# Patient Record
Sex: MALE | Race: WHITE | NOT HISPANIC OR LATINO | Employment: UNEMPLOYED | URBAN - METROPOLITAN AREA
[De-identification: names, ages, dates, MRNs, and addresses within clinical notes are randomized per-mention and may not be internally consistent; named-entity substitution may affect disease eponyms.]

---

## 2020-03-03 ENCOUNTER — ANESTHESIA EVENT (OUTPATIENT)
Dept: SURGERY | Facility: MEDICAL CENTER | Age: 25
DRG: 580 | End: 2020-03-03
Payer: MEDICARE

## 2020-03-03 ENCOUNTER — ANESTHESIA (OUTPATIENT)
Dept: SURGERY | Facility: MEDICAL CENTER | Age: 25
DRG: 580 | End: 2020-03-03
Payer: MEDICARE

## 2020-03-03 ENCOUNTER — APPOINTMENT (OUTPATIENT)
Dept: RADIOLOGY | Facility: MEDICAL CENTER | Age: 25
DRG: 580 | End: 2020-03-03
Attending: EMERGENCY MEDICINE
Payer: MEDICARE

## 2020-03-03 ENCOUNTER — HOSPITAL ENCOUNTER (INPATIENT)
Facility: MEDICAL CENTER | Age: 25
LOS: 7 days | DRG: 580 | End: 2020-03-10
Attending: EMERGENCY MEDICINE
Payer: MEDICARE

## 2020-03-03 PROBLEM — F11.10 HEROIN ABUSE (HCC): Status: ACTIVE | Noted: 2020-03-03

## 2020-03-03 PROBLEM — F10.939 ALCOHOL WITHDRAWAL (HCC): Status: ACTIVE | Noted: 2020-03-03

## 2020-03-03 PROBLEM — L02.31 ABSCESS OF RIGHT BUTTOCK: Status: ACTIVE | Noted: 2020-03-03

## 2020-03-03 PROBLEM — D64.9 NORMOCYTIC ANEMIA: Status: ACTIVE | Noted: 2020-03-03

## 2020-03-03 LAB
ALBUMIN SERPL BCP-MCNC: 3.7 G/DL (ref 3.2–4.9)
ALBUMIN/GLOB SERPL: 1 G/DL
ALP SERPL-CCNC: 84 U/L (ref 30–99)
ALT SERPL-CCNC: 27 U/L (ref 2–50)
ANION GAP SERPL CALC-SCNC: 15 MMOL/L (ref 7–16)
AST SERPL-CCNC: 36 U/L (ref 12–45)
BASOPHILS # BLD AUTO: 1 % (ref 0–1.8)
BASOPHILS # BLD: 0.12 K/UL (ref 0–0.12)
BILIRUB SERPL-MCNC: 0.3 MG/DL (ref 0.1–1.5)
BUN SERPL-MCNC: 12 MG/DL (ref 8–22)
CALCIUM SERPL-MCNC: 8.9 MG/DL (ref 8.4–10.2)
CHLORIDE SERPL-SCNC: 98 MMOL/L (ref 96–112)
CO2 SERPL-SCNC: 26 MMOL/L (ref 20–33)
CREAT SERPL-MCNC: 0.81 MG/DL (ref 0.5–1.4)
EOSINOPHIL # BLD AUTO: 0 K/UL (ref 0–0.51)
EOSINOPHIL NFR BLD: 0 % (ref 0–6.9)
ERYTHROCYTE [DISTWIDTH] IN BLOOD BY AUTOMATED COUNT: 43.6 FL (ref 35.9–50)
GLOBULIN SER CALC-MCNC: 3.7 G/DL (ref 1.9–3.5)
GLUCOSE SERPL-MCNC: 94 MG/DL (ref 65–99)
HCT VFR BLD AUTO: 36.9 % (ref 42–52)
HGB BLD-MCNC: 12.2 G/DL (ref 14–18)
LYMPHOCYTES # BLD AUTO: 0.92 K/UL (ref 1–4.8)
LYMPHOCYTES NFR BLD: 8 % (ref 22–41)
MANUAL DIFF BLD: NORMAL
MCH RBC QN AUTO: 31.1 PG (ref 27–33)
MCHC RBC AUTO-ENTMCNC: 33.1 G/DL (ref 33.7–35.3)
MCV RBC AUTO: 94.1 FL (ref 81.4–97.8)
METAMYELOCYTES NFR BLD MANUAL: 1 %
MONOCYTES # BLD AUTO: 2.19 K/UL (ref 0–0.85)
MONOCYTES NFR BLD AUTO: 19 % (ref 0–13.4)
NEUTROPHILS # BLD AUTO: 8.17 K/UL (ref 1.82–7.42)
NEUTROPHILS NFR BLD: 71 % (ref 44–72)
NRBC # BLD AUTO: 0 K/UL
NRBC BLD-RTO: 0 /100 WBC
PLATELET # BLD AUTO: 452 K/UL (ref 164–446)
PLATELET BLD QL SMEAR: NORMAL
PMV BLD AUTO: 9.2 FL (ref 9–12.9)
POTASSIUM SERPL-SCNC: 4.4 MMOL/L (ref 3.6–5.5)
PROT SERPL-MCNC: 7.4 G/DL (ref 6–8.2)
RBC # BLD AUTO: 3.92 M/UL (ref 4.7–6.1)
RBC BLD AUTO: NORMAL
SODIUM SERPL-SCNC: 139 MMOL/L (ref 135–145)
TOXIC GRANULES BLD QL SMEAR: SLIGHT
WBC # BLD AUTO: 11.5 K/UL (ref 4.8–10.8)

## 2020-03-03 PROCEDURE — 72193 CT PELVIS W/DYE: CPT

## 2020-03-03 PROCEDURE — 770006 HCHG ROOM/CARE - MED/SURG/GYN SEMI*

## 2020-03-03 PROCEDURE — 160035 HCHG PACU - 1ST 60 MINS PHASE I: Performed by: SURGERY

## 2020-03-03 PROCEDURE — 99291 CRITICAL CARE FIRST HOUR: CPT

## 2020-03-03 PROCEDURE — 700105 HCHG RX REV CODE 258: Performed by: EMERGENCY MEDICINE

## 2020-03-03 PROCEDURE — 700111 HCHG RX REV CODE 636 W/ 250 OVERRIDE (IP): Performed by: ANESTHESIOLOGY

## 2020-03-03 PROCEDURE — 160036 HCHG PACU - EA ADDL 30 MINS PHASE I: Performed by: SURGERY

## 2020-03-03 PROCEDURE — 700101 HCHG RX REV CODE 250: Performed by: ANESTHESIOLOGY

## 2020-03-03 PROCEDURE — 99223 1ST HOSP IP/OBS HIGH 75: CPT | Performed by: INTERNAL MEDICINE

## 2020-03-03 PROCEDURE — 87077 CULTURE AEROBIC IDENTIFY: CPT

## 2020-03-03 PROCEDURE — A6407 PACKING STRIPS, NON-IMPREG: HCPCS | Performed by: SURGERY

## 2020-03-03 PROCEDURE — 80053 COMPREHEN METABOLIC PANEL: CPT

## 2020-03-03 PROCEDURE — 96365 THER/PROPH/DIAG IV INF INIT: CPT

## 2020-03-03 PROCEDURE — 700117 HCHG RX CONTRAST REV CODE 255: Performed by: EMERGENCY MEDICINE

## 2020-03-03 PROCEDURE — 700111 HCHG RX REV CODE 636 W/ 250 OVERRIDE (IP): Performed by: EMERGENCY MEDICINE

## 2020-03-03 PROCEDURE — 85027 COMPLETE CBC AUTOMATED: CPT

## 2020-03-03 PROCEDURE — 87040 BLOOD CULTURE FOR BACTERIA: CPT

## 2020-03-03 PROCEDURE — 87075 CULTR BACTERIA EXCEPT BLOOD: CPT

## 2020-03-03 PROCEDURE — 160028 HCHG SURGERY MINUTES - 1ST 30 MINS LEVEL 3: Performed by: SURGERY

## 2020-03-03 PROCEDURE — 87205 SMEAR GRAM STAIN: CPT

## 2020-03-03 PROCEDURE — 700111 HCHG RX REV CODE 636 W/ 250 OVERRIDE (IP)

## 2020-03-03 PROCEDURE — 700102 HCHG RX REV CODE 250 W/ 637 OVERRIDE(OP): Performed by: INTERNAL MEDICINE

## 2020-03-03 PROCEDURE — 700105 HCHG RX REV CODE 258: Performed by: INTERNAL MEDICINE

## 2020-03-03 PROCEDURE — 85007 BL SMEAR W/DIFF WBC COUNT: CPT

## 2020-03-03 PROCEDURE — 160009 HCHG ANES TIME/MIN: Performed by: SURGERY

## 2020-03-03 PROCEDURE — 96367 TX/PROPH/DG ADDL SEQ IV INF: CPT

## 2020-03-03 PROCEDURE — 87070 CULTURE OTHR SPECIMN AEROBIC: CPT

## 2020-03-03 PROCEDURE — 0J990ZZ DRAINAGE OF BUTTOCK SUBCUTANEOUS TISSUE AND FASCIA, OPEN APPROACH: ICD-10-PCS | Performed by: SURGERY

## 2020-03-03 PROCEDURE — A9270 NON-COVERED ITEM OR SERVICE: HCPCS | Performed by: INTERNAL MEDICINE

## 2020-03-03 PROCEDURE — 160002 HCHG RECOVERY MINUTES (STAT): Performed by: SURGERY

## 2020-03-03 PROCEDURE — 160048 HCHG OR STATISTICAL LEVEL 1-5: Performed by: SURGERY

## 2020-03-03 PROCEDURE — 700102 HCHG RX REV CODE 250 W/ 637 OVERRIDE(OP)

## 2020-03-03 PROCEDURE — 700111 HCHG RX REV CODE 636 W/ 250 OVERRIDE (IP): Performed by: INTERNAL MEDICINE

## 2020-03-03 PROCEDURE — 700105 HCHG RX REV CODE 258: Performed by: ANESTHESIOLOGY

## 2020-03-03 PROCEDURE — 87186 SC STD MICRODIL/AGAR DIL: CPT

## 2020-03-03 PROCEDURE — 36415 COLL VENOUS BLD VENIPUNCTURE: CPT

## 2020-03-03 PROCEDURE — A9270 NON-COVERED ITEM OR SERVICE: HCPCS

## 2020-03-03 RX ORDER — THIAMINE MONONITRATE (VIT B1) 100 MG
100 TABLET ORAL DAILY
Status: COMPLETED | OUTPATIENT
Start: 2020-03-04 | End: 2020-03-07

## 2020-03-03 RX ORDER — ONDANSETRON 4 MG/1
4 TABLET, ORALLY DISINTEGRATING ORAL EVERY 4 HOURS PRN
Status: DISCONTINUED | OUTPATIENT
Start: 2020-03-03 | End: 2020-03-10 | Stop reason: HOSPADM

## 2020-03-03 RX ORDER — HYDRALAZINE HYDROCHLORIDE 20 MG/ML
5 INJECTION INTRAMUSCULAR; INTRAVENOUS
Status: DISCONTINUED | OUTPATIENT
Start: 2020-03-03 | End: 2020-03-03 | Stop reason: HOSPADM

## 2020-03-03 RX ORDER — ONDANSETRON 2 MG/ML
4 INJECTION INTRAMUSCULAR; INTRAVENOUS
Status: DISCONTINUED | OUTPATIENT
Start: 2020-03-03 | End: 2020-03-03 | Stop reason: HOSPADM

## 2020-03-03 RX ORDER — POLYETHYLENE GLYCOL 3350 17 G/17G
1 POWDER, FOR SOLUTION ORAL
Status: DISCONTINUED | OUTPATIENT
Start: 2020-03-03 | End: 2020-03-10 | Stop reason: HOSPADM

## 2020-03-03 RX ORDER — ACETAMINOPHEN 325 MG/1
650 TABLET ORAL EVERY 6 HOURS PRN
Status: DISCONTINUED | OUTPATIENT
Start: 2020-03-03 | End: 2020-03-10 | Stop reason: HOSPADM

## 2020-03-03 RX ORDER — LORAZEPAM 2 MG/ML
1 INJECTION INTRAMUSCULAR
Status: DISCONTINUED | OUTPATIENT
Start: 2020-03-03 | End: 2020-03-09

## 2020-03-03 RX ORDER — LIDOCAINE HYDROCHLORIDE 20 MG/ML
INJECTION, SOLUTION EPIDURAL; INFILTRATION; INTRACAUDAL; PERINEURAL PRN
Status: DISCONTINUED | OUTPATIENT
Start: 2020-03-03 | End: 2020-03-03 | Stop reason: SURG

## 2020-03-03 RX ORDER — LORAZEPAM 1 MG/1
4 TABLET ORAL
Status: DISCONTINUED | OUTPATIENT
Start: 2020-03-03 | End: 2020-03-09

## 2020-03-03 RX ORDER — LORAZEPAM 0.5 MG/1
0.5 TABLET ORAL EVERY 4 HOURS PRN
Status: DISCONTINUED | OUTPATIENT
Start: 2020-03-03 | End: 2020-03-10 | Stop reason: HOSPADM

## 2020-03-03 RX ORDER — OXYCODONE HCL 5 MG/5 ML
SOLUTION, ORAL ORAL
Status: COMPLETED
Start: 2020-03-03 | End: 2020-03-03

## 2020-03-03 RX ORDER — LORAZEPAM 1 MG/1
1 TABLET ORAL EVERY 4 HOURS PRN
Status: DISCONTINUED | OUTPATIENT
Start: 2020-03-03 | End: 2020-03-09

## 2020-03-03 RX ORDER — LORAZEPAM 2 MG/ML
0.5 INJECTION INTRAMUSCULAR EVERY 4 HOURS PRN
Status: DISCONTINUED | OUTPATIENT
Start: 2020-03-03 | End: 2020-03-09

## 2020-03-03 RX ORDER — OXYCODONE HCL 5 MG/5 ML
10 SOLUTION, ORAL ORAL
Status: COMPLETED | OUTPATIENT
Start: 2020-03-03 | End: 2020-03-03

## 2020-03-03 RX ORDER — HYDROMORPHONE HYDROCHLORIDE 1 MG/ML
0.5 INJECTION, SOLUTION INTRAMUSCULAR; INTRAVENOUS; SUBCUTANEOUS
Status: DISCONTINUED | OUTPATIENT
Start: 2020-03-03 | End: 2020-03-03 | Stop reason: HOSPADM

## 2020-03-03 RX ORDER — LORAZEPAM 1 MG/1
3 TABLET ORAL
Status: DISCONTINUED | OUTPATIENT
Start: 2020-03-03 | End: 2020-03-09

## 2020-03-03 RX ORDER — ROCURONIUM BROMIDE 10 MG/ML
INJECTION, SOLUTION INTRAVENOUS PRN
Status: DISCONTINUED | OUTPATIENT
Start: 2020-03-03 | End: 2020-03-03 | Stop reason: SURG

## 2020-03-03 RX ORDER — LORAZEPAM 1 MG/1
2 TABLET ORAL
Status: DISCONTINUED | OUTPATIENT
Start: 2020-03-03 | End: 2020-03-09

## 2020-03-03 RX ORDER — SODIUM CHLORIDE, SODIUM LACTATE, POTASSIUM CHLORIDE, CALCIUM CHLORIDE 600; 310; 30; 20 MG/100ML; MG/100ML; MG/100ML; MG/100ML
INJECTION, SOLUTION INTRAVENOUS
Status: DISCONTINUED | OUTPATIENT
Start: 2020-03-03 | End: 2020-03-03 | Stop reason: SURG

## 2020-03-03 RX ORDER — SODIUM CHLORIDE, SODIUM LACTATE, POTASSIUM CHLORIDE, CALCIUM CHLORIDE 600; 310; 30; 20 MG/100ML; MG/100ML; MG/100ML; MG/100ML
INJECTION, SOLUTION INTRAVENOUS CONTINUOUS
Status: DISCONTINUED | OUTPATIENT
Start: 2020-03-03 | End: 2020-03-03 | Stop reason: HOSPADM

## 2020-03-03 RX ORDER — MEPERIDINE HYDROCHLORIDE 25 MG/ML
12.5 INJECTION INTRAMUSCULAR; INTRAVENOUS; SUBCUTANEOUS
Status: DISCONTINUED | OUTPATIENT
Start: 2020-03-03 | End: 2020-03-03 | Stop reason: HOSPADM

## 2020-03-03 RX ORDER — AMOXICILLIN 250 MG
2 CAPSULE ORAL 2 TIMES DAILY
Status: DISCONTINUED | OUTPATIENT
Start: 2020-03-03 | End: 2020-03-10 | Stop reason: HOSPADM

## 2020-03-03 RX ORDER — CLONIDINE HYDROCHLORIDE 0.1 MG/1
0.1 TABLET ORAL
Status: DISCONTINUED | OUTPATIENT
Start: 2020-03-03 | End: 2020-03-10 | Stop reason: HOSPADM

## 2020-03-03 RX ORDER — OXYCODONE HCL 5 MG/5 ML
5 SOLUTION, ORAL ORAL
Status: COMPLETED | OUTPATIENT
Start: 2020-03-03 | End: 2020-03-03

## 2020-03-03 RX ORDER — MIDAZOLAM HYDROCHLORIDE 1 MG/ML
INJECTION INTRAMUSCULAR; INTRAVENOUS PRN
Status: DISCONTINUED | OUTPATIENT
Start: 2020-03-03 | End: 2020-03-03 | Stop reason: SURG

## 2020-03-03 RX ORDER — HALOPERIDOL 5 MG/ML
1 INJECTION INTRAMUSCULAR
Status: DISCONTINUED | OUTPATIENT
Start: 2020-03-03 | End: 2020-03-03 | Stop reason: HOSPADM

## 2020-03-03 RX ORDER — ONDANSETRON 2 MG/ML
4 INJECTION INTRAMUSCULAR; INTRAVENOUS EVERY 4 HOURS PRN
Status: DISCONTINUED | OUTPATIENT
Start: 2020-03-03 | End: 2020-03-10 | Stop reason: HOSPADM

## 2020-03-03 RX ORDER — PROCHLORPERAZINE EDISYLATE 5 MG/ML
5-10 INJECTION INTRAMUSCULAR; INTRAVENOUS EVERY 4 HOURS PRN
Status: DISCONTINUED | OUTPATIENT
Start: 2020-03-03 | End: 2020-03-10 | Stop reason: HOSPADM

## 2020-03-03 RX ORDER — PROMETHAZINE HYDROCHLORIDE 25 MG/1
12.5-25 SUPPOSITORY RECTAL EVERY 4 HOURS PRN
Status: DISCONTINUED | OUTPATIENT
Start: 2020-03-03 | End: 2020-03-10 | Stop reason: HOSPADM

## 2020-03-03 RX ORDER — CEFOTETAN DISODIUM 2 G/20ML
INJECTION, POWDER, FOR SOLUTION INTRAMUSCULAR; INTRAVENOUS PRN
Status: DISCONTINUED | OUTPATIENT
Start: 2020-03-03 | End: 2020-03-03 | Stop reason: SURG

## 2020-03-03 RX ORDER — ENALAPRILAT 1.25 MG/ML
1.25 INJECTION INTRAVENOUS EVERY 6 HOURS PRN
Status: DISCONTINUED | OUTPATIENT
Start: 2020-03-03 | End: 2020-03-10 | Stop reason: HOSPADM

## 2020-03-03 RX ORDER — BISACODYL 10 MG
10 SUPPOSITORY, RECTAL RECTAL
Status: DISCONTINUED | OUTPATIENT
Start: 2020-03-03 | End: 2020-03-10 | Stop reason: HOSPADM

## 2020-03-03 RX ORDER — LORAZEPAM 2 MG/ML
2 INJECTION INTRAMUSCULAR
Status: DISCONTINUED | OUTPATIENT
Start: 2020-03-03 | End: 2020-03-09

## 2020-03-03 RX ORDER — SODIUM CHLORIDE 9 MG/ML
INJECTION, SOLUTION INTRAVENOUS CONTINUOUS
Status: DISCONTINUED | OUTPATIENT
Start: 2020-03-03 | End: 2020-03-05

## 2020-03-03 RX ORDER — DIPHENHYDRAMINE HYDROCHLORIDE 50 MG/ML
12.5 INJECTION INTRAMUSCULAR; INTRAVENOUS
Status: DISCONTINUED | OUTPATIENT
Start: 2020-03-03 | End: 2020-03-03 | Stop reason: HOSPADM

## 2020-03-03 RX ORDER — FOLIC ACID 1 MG/1
1 TABLET ORAL DAILY
Status: COMPLETED | OUTPATIENT
Start: 2020-03-04 | End: 2020-03-07

## 2020-03-03 RX ORDER — ONDANSETRON 2 MG/ML
INJECTION INTRAMUSCULAR; INTRAVENOUS PRN
Status: DISCONTINUED | OUTPATIENT
Start: 2020-03-03 | End: 2020-03-03 | Stop reason: SURG

## 2020-03-03 RX ORDER — HYDROMORPHONE HYDROCHLORIDE 1 MG/ML
0.2 INJECTION, SOLUTION INTRAMUSCULAR; INTRAVENOUS; SUBCUTANEOUS
Status: DISCONTINUED | OUTPATIENT
Start: 2020-03-03 | End: 2020-03-03 | Stop reason: HOSPADM

## 2020-03-03 RX ORDER — DEXAMETHASONE SODIUM PHOSPHATE 4 MG/ML
INJECTION, SOLUTION INTRA-ARTICULAR; INTRALESIONAL; INTRAMUSCULAR; INTRAVENOUS; SOFT TISSUE PRN
Status: DISCONTINUED | OUTPATIENT
Start: 2020-03-03 | End: 2020-03-03 | Stop reason: SURG

## 2020-03-03 RX ORDER — MAGNESIUM SULFATE HEPTAHYDRATE 40 MG/ML
2 INJECTION, SOLUTION INTRAVENOUS ONCE
Status: ACTIVE | OUTPATIENT
Start: 2020-03-03 | End: 2020-03-04

## 2020-03-03 RX ORDER — LORAZEPAM 2 MG/ML
1.5 INJECTION INTRAMUSCULAR
Status: DISCONTINUED | OUTPATIENT
Start: 2020-03-03 | End: 2020-03-09

## 2020-03-03 RX ORDER — HYDROMORPHONE HYDROCHLORIDE 1 MG/ML
0.4 INJECTION, SOLUTION INTRAMUSCULAR; INTRAVENOUS; SUBCUTANEOUS
Status: DISCONTINUED | OUTPATIENT
Start: 2020-03-03 | End: 2020-03-03 | Stop reason: HOSPADM

## 2020-03-03 RX ORDER — PROMETHAZINE HYDROCHLORIDE 25 MG/1
12.5-25 TABLET ORAL EVERY 4 HOURS PRN
Status: DISCONTINUED | OUTPATIENT
Start: 2020-03-03 | End: 2020-03-10 | Stop reason: HOSPADM

## 2020-03-03 RX ADMIN — FENTANYL CITRATE 50 MCG: 50 INJECTION, SOLUTION INTRAMUSCULAR; INTRAVENOUS at 20:52

## 2020-03-03 RX ADMIN — Medication 10 MG: at 21:36

## 2020-03-03 RX ADMIN — FENTANYL CITRATE 50 MCG: 50 INJECTION INTRAMUSCULAR; INTRAVENOUS at 21:35

## 2020-03-03 RX ADMIN — SUGAMMADEX 200 MG: 100 INJECTION, SOLUTION INTRAVENOUS at 21:04

## 2020-03-03 RX ADMIN — LIDOCAINE HYDROCHLORIDE 50 MG: 20 INJECTION, SOLUTION EPIDURAL; INFILTRATION; INTRACAUDAL; PERINEURAL at 20:41

## 2020-03-03 RX ADMIN — PROPOFOL 150 MG: 10 INJECTION, EMULSION INTRAVENOUS at 20:41

## 2020-03-03 RX ADMIN — CEFOTETAN DISODIUM 2 G: 2 INJECTION, POWDER, FOR SOLUTION INTRAMUSCULAR; INTRAVENOUS at 20:38

## 2020-03-03 RX ADMIN — SODIUM CHLORIDE: 9 INJECTION, SOLUTION INTRAVENOUS at 23:26

## 2020-03-03 RX ADMIN — FENTANYL CITRATE 50 MCG: 50 INJECTION INTRAMUSCULAR; INTRAVENOUS at 21:43

## 2020-03-03 RX ADMIN — OXYCODONE HYDROCHLORIDE 10 MG: 5 SOLUTION ORAL at 21:36

## 2020-03-03 RX ADMIN — VANCOMYCIN HYDROCHLORIDE 1250 MG: 500 INJECTION, POWDER, LYOPHILIZED, FOR SOLUTION INTRAVENOUS at 19:16

## 2020-03-03 RX ADMIN — MIDAZOLAM HYDROCHLORIDE 2 MG: 1 INJECTION, SOLUTION INTRAMUSCULAR; INTRAVENOUS at 20:38

## 2020-03-03 RX ADMIN — ROCURONIUM BROMIDE 50 MG: 10 INJECTION, SOLUTION INTRAVENOUS at 20:41

## 2020-03-03 RX ADMIN — AMPICILLIN AND SULBACTAM 3 G: 2; 1 INJECTION, POWDER, FOR SOLUTION INTRAMUSCULAR; INTRAVENOUS at 18:29

## 2020-03-03 RX ADMIN — LORAZEPAM 0.5 MG: 0.5 TABLET ORAL at 23:38

## 2020-03-03 RX ADMIN — DEXAMETHASONE SODIUM PHOSPHATE 8 MG: 4 INJECTION, SOLUTION INTRAMUSCULAR; INTRAVENOUS at 20:47

## 2020-03-03 RX ADMIN — AMPICILLIN SODIUM AND SULBACTAM SODIUM 3 G: 2; 1 INJECTION, POWDER, FOR SOLUTION INTRAMUSCULAR; INTRAVENOUS at 23:20

## 2020-03-03 RX ADMIN — FENTANYL CITRATE 100 MCG: 50 INJECTION, SOLUTION INTRAMUSCULAR; INTRAVENOUS at 20:41

## 2020-03-03 RX ADMIN — IOHEXOL 100 ML: 350 INJECTION, SOLUTION INTRAVENOUS at 19:06

## 2020-03-03 RX ADMIN — SODIUM CHLORIDE, POTASSIUM CHLORIDE, SODIUM LACTATE AND CALCIUM CHLORIDE: 600; 310; 30; 20 INJECTION, SOLUTION INTRAVENOUS at 20:38

## 2020-03-03 RX ADMIN — ONDANSETRON 4 MG: 2 INJECTION INTRAMUSCULAR; INTRAVENOUS at 21:04

## 2020-03-03 ASSESSMENT — ENCOUNTER SYMPTOMS
MYALGIAS: 0
CLAUDICATION: 0
TREMORS: 0
SPUTUM PRODUCTION: 0
SHORTNESS OF BREATH: 0
NERVOUS/ANXIOUS: 0
NAUSEA: 0
ABDOMINAL PAIN: 0
DIAPHORESIS: 0
TINGLING: 0
INSOMNIA: 0
DEPRESSION: 0
HEARTBURN: 0
PALPITATIONS: 0
DIARRHEA: 0
HEADACHES: 0
CONSTIPATION: 0
FEVER: 0
DIZZINESS: 0
BACK PAIN: 0
CHILLS: 0
COUGH: 0

## 2020-03-03 ASSESSMENT — LIFESTYLE VARIABLES
ON A TYPICAL DAY WHEN YOU DRINK ALCOHOL HOW MANY DRINKS DO YOU HAVE: 4
TOTAL SCORE: VERY MILD ITCHING, PINS AND NEEDLES SENSATION, BURNING OR NUMBNESS
TOTAL SCORE: 5
ALCOHOL_USE: YES
NAUSEA AND VOMITING: NO NAUSEA AND NO VOMITING
ORIENTATION AND CLOUDING OF SENSORIUM: ORIENTED AND CAN DO SERIAL ADDITIONS
HEADACHE, FULLNESS IN HEAD: NOT PRESENT
HOW MANY TIMES IN THE PAST YEAR HAVE YOU HAD 5 OR MORE DRINKS IN A DAY: 7
EVER HAD A DRINK FIRST THING IN THE MORNING TO STEADY YOUR NERVES TO GET RID OF A HANGOVER: YES
AGITATION: NORMAL ACTIVITY
TREMOR: NO TREMOR
HAVE PEOPLE ANNOYED YOU BY CRITICIZING YOUR DRINKING: YES
TOTAL SCORE: 4
CONSUMPTION TOTAL: POSITIVE
DO YOU DRINK ALCOHOL: NO
ANXIETY: *
AUDITORY DISTURBANCES: NOT PRESENT
PAROXYSMAL SWEATS: BARELY PERCEPTIBLE SWEATING. PALMS MOIST
HAVE YOU EVER FELT YOU SHOULD CUT DOWN ON YOUR DRINKING: YES
AVERAGE NUMBER OF DAYS PER WEEK YOU HAVE A DRINK CONTAINING ALCOHOL: 6
EVER_SMOKED: YES
EVER FELT BAD OR GUILTY ABOUT YOUR DRINKING: YES
TOTAL SCORE: 4
VISUAL DISTURBANCES: VERY MILD SENSITIVITY
TOTAL SCORE: 4

## 2020-03-03 ASSESSMENT — PATIENT HEALTH QUESTIONNAIRE - PHQ9
2. FEELING DOWN, DEPRESSED, IRRITABLE, OR HOPELESS: NOT AT ALL
1. LITTLE INTEREST OR PLEASURE IN DOING THINGS: NOT AT ALL
SUM OF ALL RESPONSES TO PHQ9 QUESTIONS 1 AND 2: 0

## 2020-03-03 ASSESSMENT — FIBROSIS 4 INDEX: FIB4 SCORE: 0.4

## 2020-03-03 ASSESSMENT — PAIN SCALES - GENERAL: PAIN_LEVEL: 5

## 2020-03-04 ENCOUNTER — APPOINTMENT (OUTPATIENT)
Dept: CARDIOLOGY | Facility: MEDICAL CENTER | Age: 25
DRG: 580 | End: 2020-03-04
Attending: HOSPITALIST
Payer: MEDICARE

## 2020-03-04 LAB
ANION GAP SERPL CALC-SCNC: 11 MMOL/L (ref 7–16)
BASOPHILS # BLD AUTO: 0.7 % (ref 0–1.8)
BASOPHILS # BLD: 0.06 K/UL (ref 0–0.12)
BUN SERPL-MCNC: 11 MG/DL (ref 8–22)
CALCIUM SERPL-MCNC: 8.9 MG/DL (ref 8.4–10.2)
CHLORIDE SERPL-SCNC: 104 MMOL/L (ref 96–112)
CO2 SERPL-SCNC: 24 MMOL/L (ref 20–33)
CREAT SERPL-MCNC: 0.75 MG/DL (ref 0.5–1.4)
EOSINOPHIL # BLD AUTO: 0 K/UL (ref 0–0.51)
EOSINOPHIL NFR BLD: 0 % (ref 0–6.9)
ERYTHROCYTE [DISTWIDTH] IN BLOOD BY AUTOMATED COUNT: 43.6 FL (ref 35.9–50)
GLUCOSE SERPL-MCNC: 146 MG/DL (ref 65–99)
GRAM STN SPEC: NORMAL
HCT VFR BLD AUTO: 37.8 % (ref 42–52)
HGB BLD-MCNC: 12.2 G/DL (ref 14–18)
IMM GRANULOCYTES # BLD AUTO: 0.16 K/UL (ref 0–0.11)
IMM GRANULOCYTES NFR BLD AUTO: 1.9 % (ref 0–0.9)
LV EJECT FRACT  99904: 65
LV EJECT FRACT MOD 2C 99903: 62.14
LV EJECT FRACT MOD 4C 99902: 67.64
LV EJECT FRACT MOD BP 99901: 65.13
LYMPHOCYTES # BLD AUTO: 0.58 K/UL (ref 1–4.8)
LYMPHOCYTES NFR BLD: 6.7 % (ref 22–41)
MAGNESIUM SERPL-MCNC: 2.3 MG/DL (ref 1.5–2.5)
MCH RBC QN AUTO: 30.5 PG (ref 27–33)
MCHC RBC AUTO-ENTMCNC: 32.3 G/DL (ref 33.7–35.3)
MCV RBC AUTO: 94.5 FL (ref 81.4–97.8)
MONOCYTES # BLD AUTO: 0.2 K/UL (ref 0–0.85)
MONOCYTES NFR BLD AUTO: 2.3 % (ref 0–13.4)
NEUTROPHILS # BLD AUTO: 7.62 K/UL (ref 1.82–7.42)
NEUTROPHILS NFR BLD: 88.4 % (ref 44–72)
NRBC # BLD AUTO: 0 K/UL
NRBC BLD-RTO: 0 /100 WBC
PHOSPHATE SERPL-MCNC: 3.3 MG/DL (ref 2.5–4.5)
PLATELET # BLD AUTO: 441 K/UL (ref 164–446)
PMV BLD AUTO: 9.5 FL (ref 9–12.9)
POTASSIUM SERPL-SCNC: 4.4 MMOL/L (ref 3.6–5.5)
RBC # BLD AUTO: 4 M/UL (ref 4.7–6.1)
SIGNIFICANT IND 70042: NORMAL
SITE SITE: NORMAL
SODIUM SERPL-SCNC: 139 MMOL/L (ref 135–145)
SOURCE SOURCE: NORMAL
VANCOMYCIN TROUGH SERPL-MCNC: 10.9 UG/ML (ref 10–20)
WBC # BLD AUTO: 8.6 K/UL (ref 4.8–10.8)

## 2020-03-04 PROCEDURE — 700111 HCHG RX REV CODE 636 W/ 250 OVERRIDE (IP)

## 2020-03-04 PROCEDURE — 80048 BASIC METABOLIC PNL TOTAL CA: CPT

## 2020-03-04 PROCEDURE — 700102 HCHG RX REV CODE 250 W/ 637 OVERRIDE(OP): Performed by: INTERNAL MEDICINE

## 2020-03-04 PROCEDURE — 85025 COMPLETE CBC W/AUTO DIFF WBC: CPT

## 2020-03-04 PROCEDURE — 87040 BLOOD CULTURE FOR BACTERIA: CPT | Mod: 91

## 2020-03-04 PROCEDURE — 87389 HIV-1 AG W/HIV-1&-2 AB AG IA: CPT

## 2020-03-04 PROCEDURE — A6212 FOAM DRG <=16 SQ IN W/BORDER: HCPCS | Performed by: HOSPITALIST

## 2020-03-04 PROCEDURE — 80202 ASSAY OF VANCOMYCIN: CPT

## 2020-03-04 PROCEDURE — 83735 ASSAY OF MAGNESIUM: CPT

## 2020-03-04 PROCEDURE — 36415 COLL VENOUS BLD VENIPUNCTURE: CPT

## 2020-03-04 PROCEDURE — 770020 HCHG ROOM/CARE - TELE (206)

## 2020-03-04 PROCEDURE — 84100 ASSAY OF PHOSPHORUS: CPT

## 2020-03-04 PROCEDURE — 700111 HCHG RX REV CODE 636 W/ 250 OVERRIDE (IP): Performed by: INTERNAL MEDICINE

## 2020-03-04 PROCEDURE — A9270 NON-COVERED ITEM OR SERVICE: HCPCS | Performed by: INTERNAL MEDICINE

## 2020-03-04 PROCEDURE — 700105 HCHG RX REV CODE 258: Performed by: INTERNAL MEDICINE

## 2020-03-04 PROCEDURE — 700111 HCHG RX REV CODE 636 W/ 250 OVERRIDE (IP): Performed by: HOSPITALIST

## 2020-03-04 PROCEDURE — 99233 SBSQ HOSP IP/OBS HIGH 50: CPT | Performed by: HOSPITALIST

## 2020-03-04 PROCEDURE — 93306 TTE W/DOPPLER COMPLETE: CPT | Mod: 26 | Performed by: INTERNAL MEDICINE

## 2020-03-04 PROCEDURE — 93306 TTE W/DOPPLER COMPLETE: CPT

## 2020-03-04 RX ORDER — ASPIRIN 325 MG
650 TABLET ORAL EVERY 6 HOURS PRN
Status: ON HOLD | COMMUNITY
End: 2020-03-10

## 2020-03-04 RX ORDER — NAPROXEN SODIUM 220 MG
440 TABLET ORAL PRN
Status: ON HOLD | COMMUNITY
End: 2020-03-10

## 2020-03-04 RX ORDER — HYDROMORPHONE HYDROCHLORIDE 1 MG/ML
0.5 INJECTION, SOLUTION INTRAMUSCULAR; INTRAVENOUS; SUBCUTANEOUS
Status: COMPLETED | OUTPATIENT
Start: 2020-03-04 | End: 2020-03-04

## 2020-03-04 RX ORDER — HYDROMORPHONE HYDROCHLORIDE 1 MG/ML
0.5 INJECTION, SOLUTION INTRAMUSCULAR; INTRAVENOUS; SUBCUTANEOUS 2 TIMES DAILY PRN
Status: DISCONTINUED | OUTPATIENT
Start: 2020-03-04 | End: 2020-03-10 | Stop reason: HOSPADM

## 2020-03-04 RX ADMIN — AMPICILLIN SODIUM AND SULBACTAM SODIUM 3 G: 2; 1 INJECTION, POWDER, FOR SOLUTION INTRAMUSCULAR; INTRAVENOUS at 12:08

## 2020-03-04 RX ADMIN — LORAZEPAM 1 MG: 1 TABLET ORAL at 07:55

## 2020-03-04 RX ADMIN — SODIUM CHLORIDE: 9 INJECTION, SOLUTION INTRAVENOUS at 12:16

## 2020-03-04 RX ADMIN — AMPICILLIN SODIUM AND SULBACTAM SODIUM 3 G: 2; 1 INJECTION, POWDER, FOR SOLUTION INTRAMUSCULAR; INTRAVENOUS at 05:50

## 2020-03-04 RX ADMIN — HYDROMORPHONE HYDROCHLORIDE 0.5 MG: 1 INJECTION, SOLUTION INTRAMUSCULAR; INTRAVENOUS; SUBCUTANEOUS at 10:58

## 2020-03-04 RX ADMIN — FOLIC ACID 1 MG: 1 TABLET ORAL at 05:50

## 2020-03-04 RX ADMIN — Medication 400 MG: at 17:15

## 2020-03-04 RX ADMIN — LORAZEPAM 2 MG: 1 TABLET ORAL at 17:15

## 2020-03-04 RX ADMIN — Medication 100 MG: at 05:50

## 2020-03-04 RX ADMIN — AMPICILLIN SODIUM AND SULBACTAM SODIUM 3 G: 2; 1 INJECTION, POWDER, FOR SOLUTION INTRAMUSCULAR; INTRAVENOUS at 17:15

## 2020-03-04 RX ADMIN — VANCOMYCIN HYDROCHLORIDE 750 MG: 500 INJECTION, POWDER, LYOPHILIZED, FOR SOLUTION INTRAVENOUS at 04:01

## 2020-03-04 RX ADMIN — VANCOMYCIN HYDROCHLORIDE 750 MG: 500 INJECTION, POWDER, LYOPHILIZED, FOR SOLUTION INTRAVENOUS at 12:40

## 2020-03-04 RX ADMIN — Medication 400 MG: at 05:49

## 2020-03-04 RX ADMIN — THERA TABS 1 TABLET: TAB at 05:50

## 2020-03-04 ASSESSMENT — LIFESTYLE VARIABLES
PAROXYSMAL SWEATS: *
TOTAL SCORE: 13
ORIENTATION AND CLOUDING OF SENSORIUM: ORIENTED AND CAN DO SERIAL ADDITIONS
TREMOR: NO TREMOR
AUDITORY DISTURBANCES: NOT PRESENT
VISUAL DISTURBANCES: NOT PRESENT
PAROXYSMAL SWEATS: BARELY PERCEPTIBLE SWEATING. PALMS MOIST
ANXIETY: MODERATELY ANXIOUS OR GUARDED, SO ANXIETY IS INFERRED
AUDITORY DISTURBANCES: NOT PRESENT
NAUSEA AND VOMITING: NO NAUSEA AND NO VOMITING
HEADACHE, FULLNESS IN HEAD: NOT PRESENT
ANXIETY: NO ANXIETY (AT EASE)
ANXIETY: NO ANXIETY (AT EASE)
NAUSEA AND VOMITING: NO NAUSEA AND NO VOMITING
TOTAL SCORE: 1
VISUAL DISTURBANCES: NOT PRESENT
AUDITORY DISTURBANCES: NOT PRESENT
VISUAL DISTURBANCES: NOT PRESENT
PAROXYSMAL SWEATS: BARELY PERCEPTIBLE SWEATING. PALMS MOIST
NAUSEA AND VOMITING: NO NAUSEA AND NO VOMITING
TOTAL SCORE: 4
ANXIETY: NO ANXIETY (AT EASE)
AUDITORY DISTURBANCES: NOT PRESENT
TOTAL SCORE: 4
ANXIETY: MILDLY ANXIOUS
HEADACHE, FULLNESS IN HEAD: NOT PRESENT
TREMOR: *
NAUSEA AND VOMITING: NO NAUSEA AND NO VOMITING
HEADACHE, FULLNESS IN HEAD: NOT PRESENT
VISUAL DISTURBANCES: VERY MILD SENSITIVITY
PAROXYSMAL SWEATS: BARELY PERCEPTIBLE SWEATING. PALMS MOIST
PAROXYSMAL SWEATS: BARELY PERCEPTIBLE SWEATING. PALMS MOIST
TREMOR: *
TREMOR: NO TREMOR
NAUSEA AND VOMITING: MILD NAUSEA WITH NO VOMITING
HEADACHE, FULLNESS IN HEAD: NOT PRESENT
TREMOR: NO TREMOR
TREMOR: TREMOR NOT VISIBLE BUT CAN BE FELT, FINGERTIP TO FINGERTIP
TREMOR: *
ANXIETY: NO ANXIETY (AT EASE)
ORIENTATION AND CLOUDING OF SENSORIUM: CANNOT DO SERIAL ADDITIONS OR IS UNCERTAIN ABOUT DATE
ORIENTATION AND CLOUDING OF SENSORIUM: ORIENTED AND CAN DO SERIAL ADDITIONS
AUDITORY DISTURBANCES: NOT PRESENT
AGITATION: NORMAL ACTIVITY
AGITATION: NORMAL ACTIVITY
TOTAL SCORE: 9
HEADACHE, FULLNESS IN HEAD: MILD
TOTAL SCORE: 1
AGITATION: NORMAL ACTIVITY
NAUSEA AND VOMITING: NO NAUSEA AND NO VOMITING
ANXIETY: NO ANXIETY (AT EASE)
PAROXYSMAL SWEATS: BARELY PERCEPTIBLE SWEATING. PALMS MOIST
NAUSEA AND VOMITING: NO NAUSEA AND NO VOMITING
HEADACHE, FULLNESS IN HEAD: NOT PRESENT
AGITATION: NORMAL ACTIVITY
ORIENTATION AND CLOUDING OF SENSORIUM: ORIENTED AND CAN DO SERIAL ADDITIONS
PAROXYSMAL SWEATS: *
VISUAL DISTURBANCES: NOT PRESENT
AGITATION: SOMEWHAT MORE THAN NORMAL ACTIVITY
AUDITORY DISTURBANCES: NOT PRESENT
VISUAL DISTURBANCES: NOT PRESENT
TREMOR: *
TOTAL SCORE: VERY MILD ITCHING, PINS AND NEEDLES SENSATION, BURNING OR NUMBNESS
NAUSEA AND VOMITING: NO NAUSEA AND NO VOMITING
HEADACHE, FULLNESS IN HEAD: NOT PRESENT
TOTAL SCORE: 4
ORIENTATION AND CLOUDING OF SENSORIUM: ORIENTED AND CAN DO SERIAL ADDITIONS
AGITATION: NORMAL ACTIVITY
ANXIETY: NO ANXIETY (AT EASE)
AUDITORY DISTURBANCES: NOT PRESENT
TOTAL SCORE: 1
ORIENTATION AND CLOUDING OF SENSORIUM: CANNOT DO SERIAL ADDITIONS OR IS UNCERTAIN ABOUT DATE
AUDITORY DISTURBANCES: VERY MILD HARSHNESS OR ABILITY TO FRIGHTEN
PAROXYSMAL SWEATS: BARELY PERCEPTIBLE SWEATING. PALMS MOIST
ORIENTATION AND CLOUDING OF SENSORIUM: ORIENTED AND CAN DO SERIAL ADDITIONS
VISUAL DISTURBANCES: NOT PRESENT
AGITATION: NORMAL ACTIVITY
VISUAL DISTURBANCES: NOT PRESENT
AGITATION: *
HEADACHE, FULLNESS IN HEAD: NOT PRESENT
ORIENTATION AND CLOUDING OF SENSORIUM: ORIENTED AND CAN DO SERIAL ADDITIONS

## 2020-03-04 ASSESSMENT — COGNITIVE AND FUNCTIONAL STATUS - GENERAL
SUGGESTED CMS G CODE MODIFIER MOBILITY: CK
MOBILITY SCORE: 18
TURNING FROM BACK TO SIDE WHILE IN FLAT BAD: A LITTLE
DRESSING REGULAR LOWER BODY CLOTHING: A LITTLE
STANDING UP FROM CHAIR USING ARMS: A LITTLE
HELP NEEDED FOR BATHING: A LITTLE
MOVING FROM LYING ON BACK TO SITTING ON SIDE OF FLAT BED: A LITTLE
DAILY ACTIVITIY SCORE: 21
MOVING TO AND FROM BED TO CHAIR: A LITTLE
WALKING IN HOSPITAL ROOM: A LITTLE
SUGGESTED CMS G CODE MODIFIER DAILY ACTIVITY: CJ
DRESSING REGULAR UPPER BODY CLOTHING: A LITTLE
CLIMB 3 TO 5 STEPS WITH RAILING: A LITTLE

## 2020-03-04 ASSESSMENT — ENCOUNTER SYMPTOMS
SHORTNESS OF BREATH: 0
BACK PAIN: 0
NECK PAIN: 0
VOMITING: 0
EYE PAIN: 0
DIZZINESS: 0
FEVER: 0
CHILLS: 0
ABDOMINAL PAIN: 0
NAUSEA: 0
COUGH: 0
SORE THROAT: 0
TINGLING: 0
DEPRESSION: 0
INSOMNIA: 0
HEADACHES: 0
BLURRED VISION: 0
PALPITATIONS: 0

## 2020-03-04 NOTE — OR SURGEON
Immediate Post OP Note    PreOp Diagnosis: right buttocks abscess    PostOp Diagnosis: right buttocks abscess    Procedure(s):  INCISION AND DRAINAGE - Wound Class: Dirty or Infected    Surgeon(s):  John Hinton M.D.    Anesthesiologist/Type of Anesthesia:  Anesthesiologist: Jerry Waggoner M.D./General    Surgical Staff:  Circulator: Bryce Saxena R.N.  Scrub Person: Josie Chang    Specimens removed if any:  ID Type Source Tests Collected by Time Destination   1 : Right buttock abcess Body Fluid Buttock AEROBIC/ANAEROBIC CULTURE (SURGERY) John Hinton M.D. 3/3/2020  8:55 PM        Estimated Blood Loss: 25 cc    Findings: abscess    Complications: none        3/3/2020 9:11 PM John Hinton M.D.

## 2020-03-04 NOTE — PROGRESS NOTES
24 yom with buttocks abscess secondary to heroin injections  Plan I & D  Discussed risks, benefits, and need for post-operative wound care  Wishes to proceed

## 2020-03-04 NOTE — ANESTHESIA POSTPROCEDURE EVALUATION
Patient: Navin Tovar Lapcourtney    Procedure Summary     Date:  03/03/20 Room / Location:   OR  / SURGERY [facility]    Anesthesia Start:  2038 Anesthesia Stop:  2112    Procedure:  INCISION AND DRAINAGE (Right Buttocks) Diagnosis:  (buttocks abscess )    Surgeon:  John Hinton M.D. Responsible Provider:  Jerry Waggoner M.D.    Anesthesia Type:  general ASA Status:  3 - Emergent          Final Anesthesia Type: general  Last vitals  BP   Blood Pressure: 123/66    Temp   37 °C (98.6 °F)    Pulse   Pulse: 93   Resp   18    SpO2   98 %      Anesthesia Post Evaluation    Patient location during evaluation: PACU  Patient participation: complete - patient participated  Level of consciousness: awake and alert  Pain score: 5    Airway patency: patent  Anesthetic complications: no  Cardiovascular status: hemodynamically stable  Respiratory status: acceptable  Hydration status: euvolemic    PONV: none

## 2020-03-04 NOTE — PROGRESS NOTES
"Pharmacy Kinetics 24 y.o. male on vancomycin day # 1 3/4/2020    Currently on Vancomycin 1250 mg IV Loading dose at 1816 hours on 3/3/20      Indication for Treatment: Cellulitis of right buttock    Pertinent history per medical record: Admitted on 3/3/2020 for cellulitis of right buttock in a IM, IV heroin user, I and D of abscess on 3/3/20.    Other antibiotics: Unasyn 3 Gm IV every 6 hours.    Allergies: Patient has no known allergies.     List concerns for renal function:  None at this time.  Pertinent cultures to date:   None at this time.    MRSA nares swab if pneumonia is a concern (ordered/positive/negative/n-a): N/A    Recent Labs     20  0422   WBC 11.5* 8.6   NEUTSPOLYS 71.00 88.40*     Recent Labs     20  1818 20  0422   BUN 12 11   CREATININE 0.81 0.75   ALBUMIN 3.7  --      No results for input(s): VANCOTROUGH, VANCOPEAK, VANCORANDOM in the last 72 hours.    Intake/Output Summary (Last 24 hours) at 3/4/2020 1358  Last data filed at 3/4/2020 1240  Gross per 24 hour   Intake 2696.57 ml   Output 2170 ml   Net 526.57 ml      /53   Pulse 79   Temp 36.4 °C (97.6 °F) (Oral)   Resp 16   Ht 1.753 m (5' 9\")   Wt 50 kg (110 lb 3.7 oz)   SpO2 97%  Temp (24hrs), Av.8 °C (98.2 °F), Min:36.3 °C (97.4 °F), Max:37.1 °C (98.8 °F)      A/P   1. Vancomycin dose change: 750 mg IV every 8 hours.  2. Next vancomycin level: 1830 hours on 3/4/20  3. Goal trough: 12-16 mcg/ml  4. Comments: Will monitor and adjust regimen after evaluation of vancomycin trough in am when pharmacy opens.    Jesus Madrid [facility]    "

## 2020-03-04 NOTE — OR NURSING
2130: care assumed of awake pt c/o 10/10 R buttock pain  2140: Pain persists   2155: Pain decreasing, O2 d/omari. Pt spoke w/family via phone  2210: eyes close briefly when not stimulated. No change in surgical site assessment.Meets criteria to transfer to [facility]

## 2020-03-04 NOTE — H&P
Hospital Medicine History & Physical Note    Date of Service  3/3/2020    Primary Care Physician  No primary care provider     Consultants  Surgery Dr. Hinton    Code Status  full    Chief Complaint  Right buttock abscess    History of Presenting Illness  24 y.o. male who presented 3/3/2020 with swelling and infection in the right buttock. He states he injected heroin a week ago into the right buttock and it has become more painful and swollen. He cut the skin open and drained some pus from the area but it continues to swell. He uses amphetamines and drinks a pint or more of alcohol daily. He denies any fever, chills, nausea, headache, rash, leg swelling or diarrhea.    Review of Systems  Review of Systems   Constitutional: Negative for chills, diaphoresis, fever and malaise/fatigue.   Respiratory: Negative for cough, sputum production and shortness of breath.    Cardiovascular: Negative for chest pain, palpitations, claudication and leg swelling.   Gastrointestinal: Negative for abdominal pain, constipation, diarrhea, heartburn and nausea.   Genitourinary: Negative for dysuria, frequency and urgency.   Musculoskeletal: Negative for back pain and myalgias.        Right Buttock pain worse with sitting   Skin: Negative for itching and rash.   Neurological: Negative for dizziness, tingling, tremors and headaches.   Psychiatric/Behavioral: Negative for depression. The patient is not nervous/anxious and does not have insomnia.    All other systems reviewed and are negative.      Past Medical History  None    Surgical History  none    Family History  Father had an unspecified heart disease    Social History   reports current drug use. Drugs: Inhaled and Injected (Skin Popping).a pint of hard liquour daily, no tobacco use, smokes and injects methamphetamines, heroin use    Allergies  No Known Allergies    Medications  None       Physical Exam  Temp:  [37 °C (98.6 °F)] 37 °C (98.6 °F)  Pulse:  [93-97] 93  Resp:  [18] 18  BP:  (123)/(66) 123/66  SpO2:  [96 %-98 %] 98 %    Physical Exam  Vitals signs and nursing note reviewed.   Constitutional:       General: He is not in acute distress.     Appearance: He is underweight. He is ill-appearing.   HENT:      Head: Atraumatic.      Nose: No congestion or rhinorrhea.      Mouth/Throat:      Mouth: Mucous membranes are dry.      Pharynx: No oropharyngeal exudate or posterior oropharyngeal erythema.      Comments: Poor dentition  Eyes:      General: No scleral icterus.     Extraocular Movements: Extraocular movements intact.      Conjunctiva/sclera: Conjunctivae normal.   Neck:      Musculoskeletal: Neck supple. No neck rigidity or muscular tenderness.   Cardiovascular:      Rate and Rhythm: Regular rhythm. Tachycardia present.      Pulses: Normal pulses.      Heart sounds: Normal heart sounds. No friction rub.   Pulmonary:      Effort: Pulmonary effort is normal. No respiratory distress.      Breath sounds: Normal breath sounds. No stridor. No wheezing or rhonchi.   Abdominal:      General: Bowel sounds are normal. There is no distension.      Palpations: Abdomen is soft. There is no mass.      Tenderness: There is no abdominal tenderness.   Musculoskeletal:         General: No swelling or deformity.   Skin:     General: Skin is warm.      Capillary Refill: Capillary refill takes less than 2 seconds.      Coloration: Skin is not jaundiced or pale.      Findings: No bruising or erythema.   Neurological:      General: No focal deficit present.      Mental Status: He is alert and oriented to person, place, and time.      Motor: Tremor present.      Coordination: Coordination normal.      Gait: Gait abnormal.   Psychiatric:         Mood and Affect: Mood normal.         Thought Content: Thought content normal.         Laboratory:  Recent Labs     03/03/20  1818   WBC 11.5*   RBC 3.92*   HEMOGLOBIN 12.2*   HEMATOCRIT 36.9*   MCV 94.1   MCH 31.1   MCHC 33.1*   RDW 43.6   PLATELETCT 452*   MPV 9.2      Recent Labs     03/03/20  1818   SODIUM 139   POTASSIUM 4.4   CHLORIDE 98   CO2 26   GLUCOSE 94   BUN 12   CREATININE 0.81   CALCIUM 8.9     Recent Labs     03/03/20  1818   ALTSGPT 27   ASTSGOT 36   ALKPHOSPHAT 84   TBILIRUBIN 0.3   GLUCOSE 94         No results for input(s): NTPROBNP in the last 72 hours.      No results for input(s): TROPONINT in the last 72 hours.    Urinalysis:    No results found     Imaging:  CT-PELVIS WITH   Final Result      1.  Medial RIGHT buttock subcutaneous abscess measuring 7.2 cm with extensive surrounding inflammatory changes.  Inflammation extends to the underlying muscle without definite intramuscular extension of abscess.   2.  Multiple small inflammatory foci within the LEFT medial gluteal subcutaneous soft tissues.   3.  Increased colonic stool suggesting constipation.            Assessment/Plan:  I anticipate this patient will require at least two midnights for appropriate medical management, necessitating inpatient admission.    Alcohol withdrawal (HCC)  Assessment & Plan  The patient is becoming more tremulous and has increased difficulty mentally focusing while waiting in the emergency department  I will order ativan as needed and monitor the patient closely    Normocytic anemia- (present on admission)  Assessment & Plan  Due to inflammation, monitor labs    Heroin abuse (HCC)- (present on admission)  Assessment & Plan  Patient advised to discontinue use    Abscess of right buttock- (present on admission)  Assessment & Plan  7.2cm on CT scan  Surgery Dr. Hinton is consulted and will take the patient to the OR for I and D  Will order wound cultures and wound care      VTE prophylaxis: scd's until after surgery

## 2020-03-04 NOTE — ED TRIAGE NOTES
"Chief Complaint   Patient presents with   • Abscess     on R buttock x 1 week     /66   Temp 37 °C (98.6 °F) (Oral)   Ht 1.753 m (5' 9\")   Wt 50 kg (110 lb 3.7 oz)   BMI 16.28 kg/m²     BIB [facility]. Pt in police custody. Pt c/o abscess on R buttock s/p injecting heroin a week ago. Pt states tried to drain at home.   "

## 2020-03-04 NOTE — CARE PLAN
Problem: Communication  Goal: The ability to communicate needs accurately and effectively will improve  Outcome: PROGRESSING AS EXPECTED   Patient A&Ox4 and uses the call light appropriately when needs arise. Plan of care reviewed with patient. Patient does want to discharge tomorrow. Will pass onto day shift RN. Hourly rounding in place.     Problem: Pain Management  Goal: Pain level will decrease to patient's comfort goal  Outcome: PROGRESSING SLOWER THAN EXPECTED  Patient pain rated as a 7/10. See MAR for medication given. Patient calm and has unlabored breathing. Will continue to monitor pain management at this time.

## 2020-03-04 NOTE — ED PROVIDER NOTES
"ED Provider Note    CHIEF COMPLAINT  Chief Complaint   Patient presents with   • Abscess     on R buttock x 1 week       HPI  Navin Kapoor is a 24 y.o. male who presents with a chief complaint of abscess on right buttock that he sustained after injecting heroin 1 week ago.  The area is painful and has been expanding.  He was arrested earlier today and when he was taken to the processing center, he complained of the abscess, was noted to be febrile and tachycardic, and was subsequently sent to the ER for medical clearance.  He tried to open the abscess yesterday with an X-Acto knife and drained significant amounts of purulent material.  The area has continued to drain pus but his symptoms are not improving.  He also notes that he smokes methamphetamines and drinks heavily.  He does not have a history of diabetes or everardo immunosuppression.    REVIEW OF SYSTEMS  See HPI for further details.  Abscess on right buttock.  Fever.  All other systems are negative.     PAST MEDICAL HISTORY       SOCIAL HISTORY  Social History     Tobacco Use   • Smoking status: Not on file   Substance and Sexual Activity   • Alcohol use: Not on file   • Drug use: Yes     Types: Inhaled, Injected (Skin Popping)   • Sexual activity: Not on file       SURGICAL HISTORY  patient denies any surgical history    CURRENT MEDICATIONS  Home Medications    **Home medications have not yet been reviewed for this encounter**         ALLERGIES  Not on File    PHYSICAL EXAM  VITAL SIGNS: /66   Pulse 97   Temp 37 °C (98.6 °F) (Oral)   Resp 18   Ht 1.753 m (5' 9\")   Wt 50 kg (110 lb 3.7 oz)   SpO2 96%   BMI 16.28 kg/m²    Pulse ox interpretation: I interpret this pulse ox as normal.  Constitutional: Alert in no apparent distress.  HENT: No signs of trauma, Bilateral external ears normal, Nose normal.  Dry mucous membranes.  Poor dentition throughout.  Eyes: Pupils are equal and reactive, Conjunctiva normal, Non-icteric.   Neck: Normal " range of motion, No tenderness, Supple, No stridor.   Lymphatic: No lymphadenopathy noted.   Cardiovascular: Tachycardic with regular rhythm, no murmurs.   Thorax & Lungs: Normal breath sounds, No respiratory distress, No wheezing, No chest tenderness.   Abdomen: Bowel sounds normal, Soft, No tenderness, No masses, No pulsatile masses. No peritoneal signs.  Skin: Large, 12 cm area of erythema extending from lateral aspect of right buttock into the crease of the buttock towards the anus.  There is a central ulceration that is draining purulent material.  There is no crepitus but there is overlying sloughing of the skin.  Associated induration.  Significant tenderness to palpation..   Back: No bony tenderness.  Extremities: Intact distal pulses, No edema, No tenderness, No cyanosis.  Musculoskeletal: Good range of motion in all major joints. No tenderness to palpation or major deformities noted.   Neurologic: Alert, Normal motor function, Normal sensory function, No focal deficits noted.   Psychiatric: Affect normal, Judgment normal, Mood normal.     DIAGNOSTIC STUDIES / PROCEDURES    LABS  CBC  CMP    RADIOLOGY  CT pelvis    COURSE & MEDICAL DECISION MAKING  Pertinent Labs & Imaging studies reviewed. (See chart for details)  This is a 24-year-old male who is here with a right buttock abscess after injecting heroin 1 week ago.  He is septic with tachycardia and leukocytosis.  A CT scan was performed for further definition of the abscess given the significant erythema and induration noted extending from the lateral aspect of his buttock to the cleft of his buttock.  The patient was started on vancomycin and Unasyn.    CT reveals a medial right buttock subcutaneous abscess measuring 7.2 cm with extensive surrounding inflammatory change.  Unclear if the abscess extends intramuscularly.    The patient will require OR drainage.  I spoke with our on-call general surgeon, Dr. ELAM, who kindly took the patient to the OR.  The  patient will be hospitalized by our hospitalist, Dr. Zuleta following the procedure.    FINAL IMPRESSION  1.  Right buttock abscess  2.  Right buttock cellulitis  3.  Heroin abuse     Electronically signed by: Cal Campos M.D., 3/3/2020 5:45 PM

## 2020-03-04 NOTE — ANESTHESIA TIME REPORT
Anesthesia Start and Stop Event Times     Date Time Event    3/3/2020 2010 Ready for Procedure     2038 Anesthesia Start     2112 Anesthesia Stop        Responsible Staff  03/03/20    Name Role Begin End    Jerry Waggoner M.D. Anesth 2038 2112        Preop Diagnosis (Free Text):  Pre-op Diagnosis     buttocks abscess         Preop Diagnosis (Codes):    Post op Diagnosis  Abscess of buttock, right      Premium Reason  A. 3PM - 7AM    Comments:

## 2020-03-04 NOTE — PROGRESS NOTES
Stable  Dressing changes pending  Continue wound care  Other treatment per hospitalist team  Can f/u my office  Surgery signing off

## 2020-03-04 NOTE — PROGRESS NOTES
Med rec updated and complete  Allergies reviewed  Pt reports no prescription medications.  Pt reports no antibiotics in the last 2 weeks

## 2020-03-04 NOTE — CONSULTS
DATE OF SERVICE:  03/03/2020    SURGERY CONSULTATION    HISTORY OF PRESENT ILLNESS:  The patient is a 24-year-old man who presented to   the emergency room for further evaluation of a buttocks abscess.  Apparently,   he has been injecting heroin into his buttocks and developed an abscess   there, which has been increasing in size and becoming increasingly painful.    He has had some systemic fevers and chills.  He has had some abscesses in the   past.    PAST MEDICAL HISTORY:  Unremarkable.    PAST SURGICAL HISTORY:  He states none.    MEDICATIONS:  No prescription medications.    ALLERGIES:  No stated drug allergies.    SOCIAL HISTORY:  He does use heroin and meth.    FAMILY HISTORY:  Essentially negative.    REVIEW OF SYSTEMS:  Ongoing illicit drug use with apparent use of illicit   drugs this morning.  Associated symptoms from that.  Otherwise, negative per   AMA and CMS criteria.    PHYSICAL EXAMINATION:  VITAL SIGNS:  Here, he has a temperature of 37.9, pulse 93, blood pressure is   123/66.  GENERAL:  He is lying in bed and is in no distress.  He does appear mildly   agitated.  HEENT:  Unremarkable.  Pupils are equal.  Oropharynx without lesions.  NECK:  Supple.  LUNGS:  Clear.  CARDIOVASCULAR:  Reveals regular rate and rhythm.  ABDOMEN:  Soft.  EXTREMITIES:  Symmetrical, without clubbing or cyanosis or edema.  He does   have a buttocks abscess with surrounding erythema.  NEUROLOGIC:  He has palpable radial and femoral pulses.  He is neurologically   intact without any grossly detectable motor or sensory deficits.    LABORATORY DATA:  Includes a white count of 11.5, hematocrit 36.9, platelets   of 452.  He has 71% neutrophils.  Sodium is 139, potassium is 4.4, chloride is   98, CO2 is 26, BUN is 12, creatinine 0.81.  Transaminases are essentially   normal.    DIAGNOSTIC IMAGING:  He did have a CT scan of his pelvis, which was done with   him prone, which does demonstrate a medial right buttock subcutaneous  abscess   measuring approximately 7.2 cm with extensive inflammatory change.    IMPRESSION:  A 24-year-old man with a buttocks abscess from illicit drug   injection, specifically heroin.  Incision and drainage is indicated.  We will   make arrangements.  I discussed the procedure with the patient in detail   including the need for packing of the abscess after and the risk of recurrent   abscess, recurrent infection and bleeding.  He understands all the above and   does wish to proceed.       ____________________________________     MD SAM BENNETT / BIMAL    DD:  03/03/2020 20:37:42  DT:  03/04/2020 01:53:11    D#:  [id number]  Job#:  [id number]

## 2020-03-04 NOTE — ASSESSMENT & PLAN NOTE
"7.2cm on CT scan  S/p I&D with Dr. Hinton      Results     Procedure Component Value Units Date/Time    BLOOD CULTURE [[id number]] Collected:  03/04/20 1157    Order Status:  Completed Specimen:  Blood from Peripheral Updated:  03/09/20 1417     Significant Indicator NEG     Source BLD     Site PERIPHERAL     Culture Result No growth after 5 days of incubation.  Blood culture testing and Gram stain, if indicated, are  performed at [facility], [address] [place] [address].  Positive blood cultures are  sent to [facility], [address] [place] [address], for organism identification and  susceptibility testing.      Narrative:       Per Hospital Policy: Only change Specimen Src: to \"Line\" if  specified by physician order.  Right Hand    BLOOD CULTURE x2 [[id number]] Collected:  03/03/20 1818    Order Status:  Completed Specimen:  Blood from Peripheral Updated:  03/09/20 0856     Significant Indicator NEG     Source BLD     Site PERIPHERAL     Culture Result No growth after 5 days of incubation.    Narrative:       Per Hospital Policy: Only change Specimen Src: to \"Line\" if  specified by physician order.  Left AC    BLOOD CULTURE x2 [[id number]] Collected:  03/03/20 1818    Order Status:  Completed Specimen:  Blood from Peripheral Updated:  03/09/20 0855     Significant Indicator NEG     Source BLD     Site PERIPHERAL     Culture Result No growth after 5 days of incubation.    Narrative:       Per Hospital Policy: Only change Specimen Src: to \"Line\" if  specified by physician order.  Right AC    Anaerobic Culture [[id number]]  (Abnormal) Collected:  03/03/20 2055    Order Status:  Completed Specimen:  Wound Updated:  03/07/20 1445     Significant Indicator POS     Source WND     Site BUTTOCK     Culture Result Growth noted after further incubation, see below for  organism identification.        Streptococcus intermedius  Heavy growth      Narrative:       Buttock Body " Fluid  Description: Right Buttock abscess  Surgery - swabs received    CULTURE WOUND W/ GRAM STAIN [[id number]]  (Abnormal)  (Susceptibility) Collected:  03/03/20 2055    Order Status:  Completed Specimen:  Wound from Buttock Updated:  03/07/20 1445     Significant Indicator POS     Source WND     Site BUTTOCK     Culture Result -     Gram Stain Result Moderate WBCs.  Moderate Gram positive cocci in chains       Culture Result Staphylococcus aureus  Rare growth        Haemophilus parainfluenzae (Beta-lactamase negative)  Moderate growth        Viridans Streptococcus  Moderate growth      Narrative:       Buttock Body Fluid  Description: Right Buttock abscess  Surgery - swabs received    Susceptibility     Staphylococcus aureus (1)     Antibiotic Interpretation Microscan Method Status    Azithromycin Sensitive <=2 mcg/mL MACK Final    Clindamycin Sensitive <=0.5 mcg/mL MACK Final    Cefazolin Sensitive <=8 mcg/mL MACK Final    Ceftaroline Sensitive <=0.5 mcg/mL MACK Final    Daptomycin Sensitive <=1 mcg/mL MACK Final    Ampicillin/sulbactam Sensitive <=8/4 mcg/mL MACK Final    Erythromycin Sensitive <=0.25 mcg/mL MACK Final    Vancomycin Sensitive 1 mcg/mL MACK Final    Oxacillin Sensitive <=0.25 mcg/mL MACK Final    Pip/Tazobactam Sensitive <=4 mcg/mL MACK Final    Trimeth/Sulfa Sensitive <=0.5/9.5 mcg/mL MACK Final    Tetracycline Sensitive <=4 mcg/mL MACK Final                   Blood Culture [[id number]] Collected:  03/04/20 1836    Order Status:  Completed Specimen:  Blood Updated:  03/05/20 0551     Significant Indicator NEG     Source BLD     Site -     Culture Result No Growth  Note: Blood cultures are incubated for 5 days and  are monitored continuously.Positive blood cultures  are called to the RN and reported as soon as  they are identified.  Blood culture testing and Gram stain, if indicated, are  performed at [facility], [address] [place] [address].  Positive blood cultures  are  sent to [facility], [address] [place] [address], for organism identification and  susceptibility testing.      Narrative:       Left Hand    BLOOD CULTURE [[id number]] Collected:  03/04/20 1830    Order Status:  Canceled Specimen:  Other from Peripheral     GRAM STAIN [[id number]] Collected:  03/03/20 2055    Order Status:  Completed Specimen:  Wound Updated:  03/04/20 1546     Significant Indicator .     Source WND     Site BUTTOCK     Gram Stain Result Moderate WBCs.  Moderate Gram positive cocci in chains      Narrative:       Buttock Body Fluid  Description: Right Buttock abscess  Surgery - swabs received        Continue wound care  Will be challenging disposition if he needs wound care, he is uninterested in attending wound care clinic

## 2020-03-04 NOTE — ANESTHESIA PREPROCEDURE EVALUATION
Active heroin use/abuse/dependence. Denies any other significant PMHx.    Relevant Problems   No relevant active problems       Physical Exam    Airway   Mallampati: I  TM distance: >3 FB  Neck ROM: full       Cardiovascular - normal exam  Rhythm: regular  Rate: normal  (-) murmur     Dental - normal exam      Very poor dentition   Pulmonary - normal exam  Breath sounds clear to auscultation     Abdominal    Neurological - normal exam                 Anesthesia Plan    ASA 3 (heroin abuse/dependence)- EMERGENT (worsening infection/abscess)   ASA physical status 3 criteria: alcohol and/or substance dependence or abuseASA physical status emergent criteria: acutely contaminated wound or identified infection source and acute deteriorating condition due to infection    Plan - general       Airway plan will be ETT        Induction: intravenous    Postoperative Plan: Postoperative administration of opioids is intended.    Pertinent diagnostic labs and testing reviewed    Informed Consent:    Anesthetic plan and risks discussed with patient.    Use of blood products discussed with: patient whom consented to blood products.

## 2020-03-04 NOTE — ASSESSMENT & PLAN NOTE
He says he drinks a 5th to a 1/2 gallon per day.  Very high risk for severe complications but has been doing well with CIWA protocol, scoring low now

## 2020-03-04 NOTE — PROGRESS NOTES
"Pharmacy Kinetics     24 y.o. male on vancomycin day # 1 3/3/2020    Currently on Vancomycin 750 mg iv q8hr  Provider specified end date: TBD, 3 days vanco ordered     Indication for Treatment: Abscess of right buttock    Pertinent history per medical record: Admitted on 3/3/2020 for right buttock abscess s/p injecting heroin a week ago, pt tries to drain at home and it became infected with drainage. I&D was performed, wound was classed as dirty or infected. Vanco and unasyn started.    Other antibiotics: Unasyn 3g IV Q6hr x5 days ordered    Allergies: Patient has no known allergies.     List concerns for renal function: None    Pertinent cultures to date:    peripheral BC x2 in process   wound culture w/gram stain needs to be collected    Recent Labs     20  1818   WBC 11.5*   NEUTSPOLYS 71.00     Recent Labs     20  1818   BUN 12   CREATININE 0.81   ALBUMIN 3.7     No results for input(s): VANCOTROUGH, VANCOPEAK, VANCORANDOM in the last 72 hours.    Intake/Output Summary (Last 24 hours) at 3/3/2020 2307  Last data filed at 3/3/2020 2208  Gross per 24 hour   Intake 1650 ml   Output 20 ml   Net 1630 ml      /64   Pulse 74   Temp 37 °C (98.6 °F) (Oral)   Resp 16   Ht 1.753 m (5' 9\")   Wt 50 kg (110 lb 3.7 oz)   SpO2 95%  Temp (24hrs), Av °C (98.6 °F), Min:36.8 °C (98.2 °F), Max:37.1 °C (98.8 °F)      A/P   1. Vancomycin dose change: New start 14mg/kg IV q8hr x3 days ordered  2. Next vancomycin level: ordered,  at 1830  3. Goal trough: 12-16 mcg/ml  4. Comments: no concern for accumulation, trough is ordered prior to 4th total dose. Pharmacy will continue to monitor and adjust dosing or recommend de-escalation as appropriate.     Viola Tamayo, PharmD        "

## 2020-03-04 NOTE — ANESTHESIA QCDR
2019 [facility] Clinical Data Registry (for Quality Improvement)     Postoperative nausea/vomiting risk protocol (Adult = 18 yrs and Pediatric 3-17 yrs)- (430 and 463)  General inhalation anesthetic (NOT TIVA) with PONV risk factors: Yes  Provision of anti-emetic therapy with at least 2 different classes of agents: Yes   Patient DID NOT receive anti-emetic therapy and reason is documented in Medical Record:  N/A    Multimodal Pain Management- (477)  Non-emergent surgery AND patient age >= 18: No  Use of Multimodal Pain Management, two or more drugs and/or interventions, NOT including systemic opioids:   Exception: Documented allergy to multiple classes of analgesics:     Smoking Abstinence (404)  Patient is current smoker (cigarette, pipe, e-cig, marijuanna): Yes  Elective Surgery: No  Abstinence instructions provided prior to day of surgery:   Patient abstained from smoking on day of surgery:     Pre-Op Beta-Blocker in Isolated CABG (44)  Isolated CABG AND patient age >= 18: No  Beta-blocker admin within 24 hours of surgical incision:   Exception:of medical reason(s) for not administering beta blocker within 24 hours prior to surgical incision (e.g., not  indicated,other medical reason):     PACU assessment of acute postoperative pain prior to Anesthesia Care End- Applies to Patients Age = 18- (ABG7)  Initial PACU pain score is which of the following: < 7/10  Patient unable to report pain score: N/A    Post-anesthetic transfer of care checklist/protocol to PACU/ICU- (426 and 427)  Upon conclusion of case, patient transferred to which of the following locations: PACU/Non-ICU  Use of transfer checklist/protocol: Yes  Exclusion: Service Performed in Patient Hospital Room (and thus did not require transfer): N/A  Unplanned admission to ICU related to anesthesia service up through end of PACU care- (MD51)  Unplanned admission to ICU (not initially anticipated at anesthesia start time): No

## 2020-03-04 NOTE — OP REPORT
DATE OF SERVICE:  03/03/2020    SURGEON:  John Hinton MD.    PREOPERATIVE DIAGNOSIS:  Right buttocks abscess.    POSTOPERATIVE DIAGNOSIS:  Right buttocks abscess.    PROCEDURE PERFORMED:  Incision and drainage of right buttocks.    ANESTHESIA:  General endotracheal anesthesia.    ANESTHESIOLOGIST:  Jerry Waggoner MD.    INDICATIONS:  A 24-year-old man with a buttocks abscess secondary to heroin   injection.  Incision and drainage is indicated.    DESCRIPTION OF PROCEDURE:  The procedure was discussed in detail with the   patient including the risk of bleeding, infection, abscess, and hematoma.  I   also discussed the need for postoperative wound care and the potential for   recurrent abscess.  He understood all the above and wished to proceed.  He was   placed under anesthesia by Dr. Waggoner.  He was subsequently placed in the   prone position.  His buttocks was prepped with Betadine prep and draped   sterilely.  Incision was made over the area of fluctuance.  Purulent fluid was   encountered.  The abscess cavity was drained completely and packed with   1-inch packing gauze.  Sterile dressings were placed.  The patient tolerated   the procedure well without apparent complication.  Wound class was class IV   due to the everardo purulent fluid that was present.       ____________________________________     MD SAM BENNETT / NTS    DD:  03/03/2020 21:14:28  DT:  03/04/2020 00:20:50    D#:  [id number]  Job#:  [id number]

## 2020-03-04 NOTE — PROGRESS NOTES
Patient arrived to floor via hospital bed with no family at bedside. Assumed care of patient. Patient is currently stating 7/10 pain to right buttock. Patient A&Ox4 and VSS except for patient wearing 2 liters of oxygen via nasal cannula. Right buttock viewed. IV patent and connected to fluids and ABX. Dressing has small serosanguinous/sanguinous drainage but is intact. CMS Intact and pulses 1+ bilaterally. Fall precautions in place. Admission profile, assessment, 2 RN skin check and med rec completed. All questions answered at this time. Hourly rounding in place. Bed locked and in lowest position with call light within reach. Bed alarm on.

## 2020-03-04 NOTE — OR NURSING
2111-Pt arrived Pacu.  Unresponsive to verbal or tactile stim.. .OA In place, clear spontaneous respirations throughout. O2 at 6LNC via LMA  VSS.  Dsg to R butt small amt red drainage noted.,  2118- LMA d/cd after spontaneous arousal. Pt tolerated well.  VSS.  IVFs infusing w/o infiltrate.  Pt denies pain or nausea. Pt awake and active. vvs.  2130- report to Charis PARRISH.

## 2020-03-04 NOTE — PROGRESS NOTES
Received report from Sandra PARRISH. Assumed care. This pt is AOx4,  reports pain, will medicate per MAR Patient and RN discussed plan of care including pain management, IV abx, : questions answered. Chart reviewed. Call light in place, fall precautions in place, patient educated on importance of calling for assistance. No additional needs at this time.

## 2020-03-04 NOTE — ANESTHESIA PROCEDURE NOTES
Airway  Date/Time: 3/3/2020 8:42 PM  Performed by: Jerry Waggoner M.D.  Authorized by: Jerry Waggoner M.D.     Location:  OR  Urgency:  Elective  Difficult Airway: No    Indications for Airway Management:  Anesthesia  Spontaneous Ventilation: absent    Sedation Level:  Deep  Preoxygenated: Yes    Patient Position:  Sniffing  Mask Difficulty Assessment:  1 - vent by mask  Final Airway Type:  Endotracheal airway  Final Endotracheal Airway:  ETT  Cuffed: Yes    Technique Used for Successful ETT Placement:  Direct laryngoscopy  Insertion Site:  Oral  Blade Type:  Yennifer  Laryngoscope Blade/Videolaryngoscope Blade Size:  3  ETT Size (mm):  7.0  Measured from:  Lips  ETT to Lips (cm):  21  Placement Verified by: auscultation and capnometry    Cormack-Lehane Classification:  Grade I - full view of glottis  Number of Attempts at Approach:  1

## 2020-03-04 NOTE — PROGRESS NOTES
2 RN skin check: skin not WDL     - elbows and heels red but blanching  - surgical incision to right buttock. Dressing intact with small drainage.     All other skin WDL

## 2020-03-04 NOTE — PROGRESS NOTES
Hospital Medicine Daily Progress Note    Date of Service  3/4/2020    Chief Complaint  24 y.o. male admitted 3/3/2020 with buttock pain.     Hospital Course    He was found to have a right buttock abscess. He has been using IV heroin and alcohol. His hospital course was complicated by alcohol withdrawal which he was also treated for. Surgery was consulted and he was taken to the OR for I&D. He was placed onto IV antibiotics. He was noted to have a murmur. An echocardiogram and blood cultures were obtained.       Interval Problem Update  Very tired today. I ordered an echo, HIV, hepatitis panel and blood cultures.         Consultants/Specialty  surgery    Code Status  full    Disposition  Tbd  **Call [place] PD before discharging as he is to go to care home once medically stable, per Jeannette Duong phone number [phone]        Review of Systems  Review of Systems   Constitutional: Positive for malaise/fatigue. Negative for chills and fever.   HENT: Negative for sore throat.    Eyes: Negative for blurred vision and pain.   Respiratory: Negative for cough and shortness of breath.    Cardiovascular: Negative for chest pain and palpitations.   Gastrointestinal: Negative for abdominal pain, nausea and vomiting.   Genitourinary: Negative for dysuria and urgency.   Musculoskeletal: Negative for back pain and neck pain.        Buttock pain   Skin: Negative for itching and rash.   Neurological: Negative for dizziness, tingling and headaches.   Psychiatric/Behavioral: Negative for depression. The patient does not have insomnia.    All other systems reviewed and are negative.       Physical Exam  Temp:  [36.3 °C (97.4 °F)-37.1 °C (98.8 °F)] 36.4 °C (97.6 °F)  Pulse:  [62-97] 79  Resp:  [14-20] 16  BP: ()/(39-68) 100/53  SpO2:  [95 %-100 %] 97 %    Physical Exam  Vitals signs and nursing note reviewed.   Constitutional:       General: He is not in acute distress.     Appearance: He is well-developed. He is not diaphoretic.       Comments: Patient seen and examined at the bedside. Plan discussed at bedside with the RN.    HENT:      Right Ear: External ear normal.      Left Ear: External ear normal.      Nose: Nose normal.   Eyes:      General: No scleral icterus.        Right eye: No discharge.         Left eye: No discharge.   Neck:      Vascular: No JVD.      Trachea: No tracheal deviation.   Cardiovascular:      Rate and Rhythm: Normal rate.      Heart sounds: Normal heart sounds. No murmur.   Pulmonary:      Effort: Pulmonary effort is normal. No respiratory distress.      Breath sounds: Normal breath sounds. No wheezing or rales.   Abdominal:      General: Bowel sounds are normal. There is no distension.      Palpations: Abdomen is soft.      Tenderness: There is no abdominal tenderness. There is no guarding.   Musculoskeletal:         General: No tenderness.   Skin:     General: Skin is warm and dry.      Findings: No erythema.   Neurological:      Mental Status: He is alert and oriented to person, place, and time.   Psychiatric:         Behavior: Behavior normal.         Fluids    Intake/Output Summary (Last 24 hours) at 3/4/2020 0940  Last data filed at 3/4/2020 0800  Gross per 24 hour   Intake 2696.57 ml   Output 1020 ml   Net 1676.57 ml       Laboratory  Recent Labs     03/03/20  1818 03/04/20  0422   WBC 11.5* 8.6   RBC 3.92* 4.00*   HEMOGLOBIN 12.2* 12.2*   HEMATOCRIT 36.9* 37.8*   MCV 94.1 94.5   MCH 31.1 30.5   MCHC 33.1* 32.3*   RDW 43.6 43.6   PLATELETCT 452* 441   MPV 9.2 9.5     Recent Labs     03/03/20  1818 03/04/20  0422   SODIUM 139 139   POTASSIUM 4.4 4.4   CHLORIDE 98 104   CO2 26 24   GLUCOSE 94 146*   BUN 12 11   CREATININE 0.81 0.75   CALCIUM 8.9 8.9                   Imaging  CT-PELVIS WITH   Final Result      1.  Medial RIGHT buttock subcutaneous abscess measuring 7.2 cm with extensive surrounding inflammatory changes.  Inflammation extends to the underlying muscle without definite intramuscular extension of  abscess.   2.  Multiple small inflammatory foci within the LEFT medial gluteal subcutaneous soft tissues.   3.  Increased colonic stool suggesting constipation.      EC-ECHOCARDIOGRAM COMPLETE W/O CONT    (Results Pending)        Assessment/Plan  Alcohol withdrawal (HCC)  Assessment & Plan  Worsening. He says he drinks a 5th to a 1/2 gallon per day. Very high risk for severe complications. Place onto tele and watch close. ciwa protocol.     Normocytic anemia- (present on admission)  Assessment & Plan  Check iron studies. B12, folate  He is young for this. Possible reactive but not clear.     Heroin abuse (HCC)- (present on admission)  Assessment & Plan  IV and IM with abcess now in buttock. He has a murmur.   Check echo, blood cultures, hiv, hepC    Abscess of right buttock- (present on admission)  Assessment & Plan  7.2cm on CT scan  S/p I&D with Dr. Hinton  pending wound cultures   Continue wound care       VTE prophylaxis: scd

## 2020-03-04 NOTE — WOUND TEAM
[facility]  Inpatient Services  Initial Wound and Skin Care Evaluation    Admission Date: 3/3/2020     Last order of IP CONSULT TO WOUND CARE was found on 3/3/2020 from Hospital Encounter on 3/3/2020       HPI, PMH, SH: Reviewed    Unit where seen by Wound Team: 2224/01     WOUND CONSULT RELATED TO:  R buttock    Self Report / Pain Level:  C/o pain       OBJECTIVE: surgical drsg leaking, pt able to move self    WOUND TYPE, LOCATION, CHARACTERISTICS (Pressure Injuries: location, stage, POA or date identified)  Wound 03/03/20 Incision Buttocks 1inch packing stripes, 4x4, medipore tape (Active)      3/4/2020 11:30 AM   Site Assessment Red;Drainage    Periwound Assessment Induration;Pink;Purple    Margins Defined edges    Closure Secondary intention    Drainage Amount Moderate    Drainage Description Serosanguineous    Treatments Cleansed    Wound Cleansing Normal Saline Irrigation    Periwound Protectant Not Applicable    Dressing Cleansing/Solutions Not Applicable    Dressing Options Iodoform Strip Packing;Nonadhesive Foam;Silicone Adhesive Foam;Hypafix Tape    Dressing Changed Changed    Dressing Status Intact    Dressing Change/Treatment Frequency Every Shift, and As Needed    NEXT Dressing Change/Treatment Date 03/04/20    NEXT Weekly Photo (Inpatient Only) 03/11/20    Non-staged Wound Description Full thickness    Wound Length (cm) 0.5 cm 3/4/2020 11:30 AM   Wound Width (cm) 3.3 cm 3/4/2020 11:30 AM   Wound Depth (cm) 1.7 cm 3/4/2020 11:30 AM   Wound Surface Area (cm^2) 1.65 cm^2 3/4/2020 11:30 AM   Wound Volume (cm^3) 2.8 cm^3 3/4/2020 11:30 AM   Tunneling (cm) 4.3 cm 3/4/2020 11:30 AM   Tunneling Clock Position of Wound 3 3/4/2020 11:30 AM   Undermining (cm) 1.5 cm 3/4/2020 11:30 AM   Undermining of Wound, 1st Location From 9 o'clock;To 2 o'clock 3/4/2020 11:30 AM   Shape elipse    Wound Odor None    Exposed Structures Muscle    Number of days: 1          Vascular:    LIZ:   No results  "found.      Lab Values:    Lab Results   Component Value Date/Time    WBC 8.6 03/04/2020 04:22 AM    RBC 4.00 (L) 03/04/2020 04:22 AM    HEMOGLOBIN 12.2 (L) 03/04/2020 04:22 AM    HEMATOCRIT 37.8 (L) 03/04/2020 04:22 AM          Culture:   Culture Results show:  No results found for this or any previous visit (from the past 720 hour(s)).      INTERVENTIONS BY WOUND TEAM:  Removed drsg by soaking with NS and then applying NS with packing removal. Cleaned wound with 18G IV catheter tip on NS flush. Dried. Loosely filled wound bed with 1/4\" wide iodoform. Covered with piece on non-adhsive foam, secured    Interdisciplinary consultation: Patient, Bedside RN, Dr Weber    EVALUATION:Pt has an I&D site from abscess to R buttock, full thickness with drainage. Pain with removal and packing of strip gauze. Strip iodoform used for antimicrobial. Non-adhesive foam and adhesive foam for drainage. Hypafix to help reinforce edges since pt had rolled some of the tape from surgical drsg.     Goals: Steady decrease in wound area and depth weekly.    NURSING PLAN OF CARE ORDERS (X):    Dressing changes: See Dressing Care orders: x  Skin care: See Skin Care orders:   Rectal tube care: See Rectal Tube Care orders:   Other orders:    RSKIN:   CURRENTLY IN PLACE (X), APPLIED THIS VISIT (A), ORDERED (O):   Q shift Deni:  x  Q shift pressure point assessments: x   Pressure redistribution mattress   x         Low Airloss          Bariatric CADEN         Bariatric foam           Heel float boots     Heel Silicone dressing        Float Heels off Bed with Pillows               Barrier wipes         Barrier Cream         Barrier paste          Sacral silicone dressing         Silicone O2 tubing         Anchorfast         Cannula fixation Device (Tender )          Gray Foam Ear protectors           Trach with Optifoam split foam                 Waffle cushion        Waffle Overlay         Rectal tube or BMS    Purwick/Condom Cath        "   Antifungal tx      Interdry          Reposition q 2 hours  Remind pt    Up to chair        Ambulate      PT/OT        Dietician        Diabetes Education      PO  x   TF     TPN     NPO   # days   Other        WOUND TEAM PLAN OF CARE   Dressing changes by wound team:          Follow up 1-2 times weekly:               Follow up 3 times weekly:                NPWT change 3 times weekly:     Follow up as needed:  x     Other (explain):     Anticipated discharge plans:  LTACH:        SNF/Rehab:                  Home Care:           Outpatient Wound Center:            Self Care:            Other: Will need wound care R buttock

## 2020-03-05 ENCOUNTER — PATIENT OUTREACH (OUTPATIENT)
Dept: HEALTH INFORMATION MANAGEMENT | Facility: OTHER | Age: 25
End: 2020-03-05

## 2020-03-05 LAB
ALBUMIN SERPL BCP-MCNC: 3 G/DL (ref 3.2–4.9)
ALBUMIN/GLOB SERPL: 1 G/DL
ALP SERPL-CCNC: 67 U/L (ref 30–99)
ALT SERPL-CCNC: 17 U/L (ref 2–50)
ANION GAP SERPL CALC-SCNC: 10 MMOL/L (ref 7–16)
AST SERPL-CCNC: 19 U/L (ref 12–45)
BASOPHILS # BLD AUTO: 0.7 % (ref 0–1.8)
BASOPHILS # BLD: 0.06 K/UL (ref 0–0.12)
BILIRUB SERPL-MCNC: 0.2 MG/DL (ref 0.1–1.5)
BUN SERPL-MCNC: 10 MG/DL (ref 8–22)
CALCIUM SERPL-MCNC: 8.3 MG/DL (ref 8.4–10.2)
CHLORIDE SERPL-SCNC: 109 MMOL/L (ref 96–112)
CO2 SERPL-SCNC: 24 MMOL/L (ref 20–33)
CREAT SERPL-MCNC: 0.71 MG/DL (ref 0.5–1.4)
EOSINOPHIL # BLD AUTO: 0.27 K/UL (ref 0–0.51)
EOSINOPHIL NFR BLD: 3 % (ref 0–6.9)
ERYTHROCYTE [DISTWIDTH] IN BLOOD BY AUTOMATED COUNT: 44 FL (ref 35.9–50)
ERYTHROCYTE [SEDIMENTATION RATE] IN BLOOD BY WESTERGREN METHOD: 36 MM/HOUR (ref 0–15)
FOLATE SERPL-MCNC: 11 NG/ML
GLOBULIN SER CALC-MCNC: 3 G/DL (ref 1.9–3.5)
GLUCOSE SERPL-MCNC: 94 MG/DL (ref 65–99)
HAV IGM SERPL QL IA: NEGATIVE
HBV CORE IGM SER QL: NEGATIVE
HBV SURFACE AG SER QL: NEGATIVE
HCT VFR BLD AUTO: 33.6 % (ref 42–52)
HCV AB SER QL: NEGATIVE
HGB BLD-MCNC: 10.6 G/DL (ref 14–18)
HIV 1+2 AB+HIV1 P24 AG SERPL QL IA: NON REACTIVE
IMM GRANULOCYTES # BLD AUTO: 0.23 K/UL (ref 0–0.11)
IMM GRANULOCYTES NFR BLD AUTO: 2.5 % (ref 0–0.9)
IRON SATN MFR SERPL: 29 % (ref 15–55)
IRON SERPL-MCNC: 60 UG/DL (ref 50–180)
LYMPHOCYTES # BLD AUTO: 1.52 K/UL (ref 1–4.8)
LYMPHOCYTES NFR BLD: 16.9 % (ref 22–41)
MCH RBC QN AUTO: 29.9 PG (ref 27–33)
MCHC RBC AUTO-ENTMCNC: 31.5 G/DL (ref 33.7–35.3)
MCV RBC AUTO: 94.9 FL (ref 81.4–97.8)
MONOCYTES # BLD AUTO: 1.13 K/UL (ref 0–0.85)
MONOCYTES NFR BLD AUTO: 12.5 % (ref 0–13.4)
NEUTROPHILS # BLD AUTO: 5.81 K/UL (ref 1.82–7.42)
NEUTROPHILS NFR BLD: 64.4 % (ref 44–72)
NRBC # BLD AUTO: 0 K/UL
NRBC BLD-RTO: 0 /100 WBC
PLATELET # BLD AUTO: 400 K/UL (ref 164–446)
PMV BLD AUTO: 9.4 FL (ref 9–12.9)
POTASSIUM SERPL-SCNC: 4 MMOL/L (ref 3.6–5.5)
PROT SERPL-MCNC: 6 G/DL (ref 6–8.2)
RBC # BLD AUTO: 3.54 M/UL (ref 4.7–6.1)
SODIUM SERPL-SCNC: 143 MMOL/L (ref 135–145)
TIBC SERPL-MCNC: 204 UG/DL (ref 250–450)
VIT B12 SERPL-MCNC: 1038 PG/ML (ref 211–911)
WBC # BLD AUTO: 9 K/UL (ref 4.8–10.8)

## 2020-03-05 PROCEDURE — 700102 HCHG RX REV CODE 250 W/ 637 OVERRIDE(OP): Performed by: HOSPITALIST

## 2020-03-05 PROCEDURE — 83540 ASSAY OF IRON: CPT

## 2020-03-05 PROCEDURE — 770020 HCHG ROOM/CARE - TELE (206)

## 2020-03-05 PROCEDURE — 85025 COMPLETE CBC W/AUTO DIFF WBC: CPT

## 2020-03-05 PROCEDURE — 700102 HCHG RX REV CODE 250 W/ 637 OVERRIDE(OP): Performed by: INTERNAL MEDICINE

## 2020-03-05 PROCEDURE — 700105 HCHG RX REV CODE 258: Performed by: HOSPITALIST

## 2020-03-05 PROCEDURE — 700111 HCHG RX REV CODE 636 W/ 250 OVERRIDE (IP): Performed by: HOSPITALIST

## 2020-03-05 PROCEDURE — 85652 RBC SED RATE AUTOMATED: CPT

## 2020-03-05 PROCEDURE — 36415 COLL VENOUS BLD VENIPUNCTURE: CPT

## 2020-03-05 PROCEDURE — 82746 ASSAY OF FOLIC ACID SERUM: CPT

## 2020-03-05 PROCEDURE — 83550 IRON BINDING TEST: CPT

## 2020-03-05 PROCEDURE — A9270 NON-COVERED ITEM OR SERVICE: HCPCS | Performed by: INTERNAL MEDICINE

## 2020-03-05 PROCEDURE — 80053 COMPREHEN METABOLIC PANEL: CPT

## 2020-03-05 PROCEDURE — 80074 ACUTE HEPATITIS PANEL: CPT

## 2020-03-05 PROCEDURE — A9270 NON-COVERED ITEM OR SERVICE: HCPCS | Performed by: HOSPITALIST

## 2020-03-05 PROCEDURE — 99233 SBSQ HOSP IP/OBS HIGH 50: CPT | Performed by: HOSPITALIST

## 2020-03-05 PROCEDURE — 700105 HCHG RX REV CODE 258: Performed by: INTERNAL MEDICINE

## 2020-03-05 PROCEDURE — 700111 HCHG RX REV CODE 636 W/ 250 OVERRIDE (IP): Performed by: INTERNAL MEDICINE

## 2020-03-05 PROCEDURE — 82607 VITAMIN B-12: CPT

## 2020-03-05 RX ORDER — METHADONE HYDROCHLORIDE 10 MG/1
10 TABLET ORAL 2 TIMES DAILY
Status: DISCONTINUED | OUTPATIENT
Start: 2020-03-05 | End: 2020-03-07

## 2020-03-05 RX ADMIN — SODIUM CHLORIDE: 9 INJECTION, SOLUTION INTRAVENOUS at 00:39

## 2020-03-05 RX ADMIN — VANCOMYCIN HYDROCHLORIDE 750 MG: 500 INJECTION, POWDER, LYOPHILIZED, FOR SOLUTION INTRAVENOUS at 10:03

## 2020-03-05 RX ADMIN — AMPICILLIN SODIUM AND SULBACTAM SODIUM 3 G: 2; 1 INJECTION, POWDER, FOR SOLUTION INTRAMUSCULAR; INTRAVENOUS at 12:27

## 2020-03-05 RX ADMIN — LORAZEPAM 1 MG: 1 TABLET ORAL at 14:16

## 2020-03-05 RX ADMIN — VANCOMYCIN HYDROCHLORIDE 750 MG: 500 INJECTION, POWDER, LYOPHILIZED, FOR SOLUTION INTRAVENOUS at 00:39

## 2020-03-05 RX ADMIN — Medication 400 MG: at 21:13

## 2020-03-05 RX ADMIN — METHADONE HYDROCHLORIDE 10 MG: 10 TABLET ORAL at 20:13

## 2020-03-05 RX ADMIN — FOLIC ACID 1 MG: 1 TABLET ORAL at 05:03

## 2020-03-05 RX ADMIN — HYDROMORPHONE HYDROCHLORIDE 0.5 MG: 1 INJECTION, SOLUTION INTRAMUSCULAR; INTRAVENOUS; SUBCUTANEOUS at 21:13

## 2020-03-05 RX ADMIN — Medication 400 MG: at 05:03

## 2020-03-05 RX ADMIN — Medication 100 MG: at 05:03

## 2020-03-05 RX ADMIN — VANCOMYCIN HYDROCHLORIDE 750 MG: 500 INJECTION, POWDER, LYOPHILIZED, FOR SOLUTION INTRAVENOUS at 18:12

## 2020-03-05 RX ADMIN — AMPICILLIN SODIUM AND SULBACTAM SODIUM 3 G: 2; 1 INJECTION, POWDER, FOR SOLUTION INTRAMUSCULAR; INTRAVENOUS at 17:06

## 2020-03-05 RX ADMIN — AMPICILLIN SODIUM AND SULBACTAM SODIUM 3 G: 2; 1 INJECTION, POWDER, FOR SOLUTION INTRAMUSCULAR; INTRAVENOUS at 00:38

## 2020-03-05 RX ADMIN — THERA TABS 1 TABLET: TAB at 05:03

## 2020-03-05 RX ADMIN — LORAZEPAM 0.5 MG: 2 INJECTION INTRAMUSCULAR; INTRAVENOUS at 10:00

## 2020-03-05 RX ADMIN — LORAZEPAM 1 MG: 1 TABLET ORAL at 22:15

## 2020-03-05 RX ADMIN — LORAZEPAM 1 MG: 1 TABLET ORAL at 01:53

## 2020-03-05 RX ADMIN — AMPICILLIN SODIUM AND SULBACTAM SODIUM 3 G: 2; 1 INJECTION, POWDER, FOR SOLUTION INTRAMUSCULAR; INTRAVENOUS at 05:03

## 2020-03-05 ASSESSMENT — LIFESTYLE VARIABLES
HEADACHE, FULLNESS IN HEAD: NOT PRESENT
VISUAL DISTURBANCES: NOT PRESENT
NAUSEA AND VOMITING: MILD NAUSEA WITH NO VOMITING
AGITATION: SOMEWHAT MORE THAN NORMAL ACTIVITY
ORIENTATION AND CLOUDING OF SENSORIUM: ORIENTED AND CAN DO SERIAL ADDITIONS
HEADACHE, FULLNESS IN HEAD: NOT PRESENT
PAROXYSMAL SWEATS: *
NAUSEA AND VOMITING: MILD NAUSEA WITH NO VOMITING
PAROXYSMAL SWEATS: NO SWEAT VISIBLE
ORIENTATION AND CLOUDING OF SENSORIUM: ORIENTED AND CAN DO SERIAL ADDITIONS
TREMOR: *
AGITATION: SOMEWHAT MORE THAN NORMAL ACTIVITY
PAROXYSMAL SWEATS: BARELY PERCEPTIBLE SWEATING. PALMS MOIST
AGITATION: NORMAL ACTIVITY
ORIENTATION AND CLOUDING OF SENSORIUM: ORIENTED AND CAN DO SERIAL ADDITIONS
HEADACHE, FULLNESS IN HEAD: MILD
TREMOR: *
PAROXYSMAL SWEATS: *
PAROXYSMAL SWEATS: BARELY PERCEPTIBLE SWEATING. PALMS MOIST
TOTAL SCORE: 10
ANXIETY: NO ANXIETY (AT EASE)
ANXIETY: MILDLY ANXIOUS
ORIENTATION AND CLOUDING OF SENSORIUM: ORIENTED AND CAN DO SERIAL ADDITIONS
VISUAL DISTURBANCES: VERY MILD SENSITIVITY
TOTAL SCORE: VERY MILD ITCHING, PINS AND NEEDLES SENSATION, BURNING OR NUMBNESS
NAUSEA AND VOMITING: NO NAUSEA AND NO VOMITING
TREMOR: *
VISUAL DISTURBANCES: NOT PRESENT
AUDITORY DISTURBANCES: NOT PRESENT
AUDITORY DISTURBANCES: NOT PRESENT
TREMOR: NO TREMOR
TOTAL SCORE: 5
ANXIETY: NO ANXIETY (AT EASE)
ANXIETY: *
ORIENTATION AND CLOUDING OF SENSORIUM: ORIENTED AND CAN DO SERIAL ADDITIONS
NAUSEA AND VOMITING: MILD NAUSEA WITH NO VOMITING
HEADACHE, FULLNESS IN HEAD: MODERATE
ANXIETY: MILDLY ANXIOUS
VISUAL DISTURBANCES: NOT PRESENT
ORIENTATION AND CLOUDING OF SENSORIUM: ORIENTED AND CAN DO SERIAL ADDITIONS
AGITATION: NORMAL ACTIVITY
AUDITORY DISTURBANCES: NOT PRESENT
TREMOR: MODERATE TREMOR WITH ARMS EXTENDED
HEADACHE, FULLNESS IN HEAD: VERY MILD
ANXIETY: MILDLY ANXIOUS
AGITATION: NORMAL ACTIVITY
VISUAL DISTURBANCES: NOT PRESENT
NAUSEA AND VOMITING: NO NAUSEA AND NO VOMITING
TOTAL SCORE: 8
AUDITORY DISTURBANCES: VERY MILD HARSHNESS OR ABILITY TO FRIGHTEN
TOTAL SCORE: 9
VISUAL DISTURBANCES: NOT PRESENT
TOTAL SCORE: 1
TOTAL SCORE: 10
TREMOR: *
AUDITORY DISTURBANCES: NOT PRESENT
PAROXYSMAL SWEATS: BARELY PERCEPTIBLE SWEATING. PALMS MOIST
HEADACHE, FULLNESS IN HEAD: NOT PRESENT
AUDITORY DISTURBANCES: NOT PRESENT
AGITATION: NORMAL ACTIVITY
NAUSEA AND VOMITING: *

## 2020-03-05 ASSESSMENT — ENCOUNTER SYMPTOMS
NAUSEA: 0
CHILLS: 0
COUGH: 0
INSOMNIA: 0
PALPITATIONS: 0
NECK PAIN: 0
ABDOMINAL PAIN: 0
TINGLING: 0
DIZZINESS: 0
DEPRESSION: 0
BACK PAIN: 0
HEADACHES: 0
VOMITING: 0
SHORTNESS OF BREATH: 0
FEVER: 0

## 2020-03-05 NOTE — PROGRESS NOTES
[facility] Medicine Daily Progress Note    Date of Service  3/5/2020    Chief Complaint  24 y.o. male admitted 3/3/2020 with buttock pain.     Hospital Course    He was found to have a right buttock abscess. He has been using IV heroin and alcohol. His hospital course was complicated by alcohol withdrawal which he was also treated for. Surgery was consulted and he was taken to the OR for I&D. He was placed onto IV antibiotics. He was noted to have a murmur. An echocardiogram and blood cultures were obtained.       Interval Problem Update  Continues to sleep all day  No Cp or SOB  Methadone starting tonight  Awaiting cultures     Consultants/Specialty  surgery    Code Status  full    Disposition  Tbd  **Call [place] PD before discharging as he is to go to snf once medically stable, per Jeannette Duong phone number [phone]    Review of Systems  Review of Systems   Constitutional: Positive for malaise/fatigue. Negative for chills and fever.   Respiratory: Negative for cough and shortness of breath.    Cardiovascular: Negative for chest pain and palpitations.   Gastrointestinal: Negative for abdominal pain, nausea and vomiting.   Musculoskeletal: Negative for back pain and neck pain.        Buttock pain   Skin: Negative for itching and rash.   Neurological: Negative for dizziness, tingling and headaches.   Psychiatric/Behavioral: Negative for depression. The patient does not have insomnia.    All other systems reviewed and are negative.       Physical Exam  Temp:  [36.7 °C (98 °F)-36.9 °C (98.5 °F)] 36.7 °C (98 °F)  Pulse:  [65-77] 68  Resp:  [16-18] 18  BP: ()/(43-62) 115/52  SpO2:  [95 %-99 %] 99 %    Physical Exam  Vitals signs and nursing note reviewed.   Constitutional:       General: He is not in acute distress.     Appearance: He is well-developed and normal weight. He is not diaphoretic.      Comments: Patient seen and examined at the bedside. Plan discussed at bedside with the RN.     Body mass index is  16.28 kg/m².   HENT:      Right Ear: External ear normal.      Left Ear: External ear normal.      Nose: Nose normal.   Eyes:      General: No scleral icterus.        Right eye: No discharge.         Left eye: No discharge.   Neck:      Vascular: No JVD.      Trachea: No tracheal deviation.   Cardiovascular:      Rate and Rhythm: Normal rate.      Heart sounds: Normal heart sounds. No murmur.   Pulmonary:      Effort: Pulmonary effort is normal. No respiratory distress.      Breath sounds: Normal breath sounds. No wheezing or rales.   Abdominal:      General: Bowel sounds are normal. There is no distension.      Palpations: Abdomen is soft.      Tenderness: There is no abdominal tenderness. There is no guarding.   Musculoskeletal: Normal range of motion.         General: No swelling or tenderness.   Skin:     General: Skin is warm and dry.      Coloration: Skin is not jaundiced.      Findings: No bruising or erythema.   Neurological:      Mental Status: He is alert and oriented to person, place, and time.   Psychiatric:         Behavior: Behavior normal.         Fluids    Intake/Output Summary (Last 24 hours) at 3/5/2020 1423  Last data filed at 3/5/2020 1200  Gross per 24 hour   Intake 2670 ml   Output 850 ml   Net 1820 ml       Laboratory  Recent Labs     03/03/20  1818 03/04/20  0422 03/05/20  0523   WBC 11.5* 8.6 9.0   RBC 3.92* 4.00* 3.54*   HEMOGLOBIN 12.2* 12.2* 10.6*   HEMATOCRIT 36.9* 37.8* 33.6*   MCV 94.1 94.5 94.9   MCH 31.1 30.5 29.9   MCHC 33.1* 32.3* 31.5*   RDW 43.6 43.6 44.0   PLATELETCT 452* 441 400   MPV 9.2 9.5 9.4     Recent Labs     03/03/20  1818 03/04/20  0422 03/05/20  0523   SODIUM 139 139 143   POTASSIUM 4.4 4.4 4.0   CHLORIDE 98 104 109   CO2 26 24 24   GLUCOSE 94 146* 94   BUN 12 11 10   CREATININE 0.81 0.75 0.71   CALCIUM 8.9 8.9 8.3*                   Imaging  EC-ECHOCARDIOGRAM COMPLETE W/O CONT   Final Result      CT-PELVIS WITH   Final Result      1.  Medial RIGHT buttock  subcutaneous abscess measuring 7.2 cm with extensive surrounding inflammatory changes.  Inflammation extends to the underlying muscle without definite intramuscular extension of abscess.   2.  Multiple small inflammatory foci within the LEFT medial gluteal subcutaneous soft tissues.   3.  Increased colonic stool suggesting constipation.           Assessment/Plan  * Abscess of right buttock- (present on admission)  Assessment & Plan  7.2cm on CT scan  S/p I&D with Dr. Hinton  pending wound cultures  Continue wound care  I am monitoring vancomycin toxicity and therapeutics    Alcohol withdrawal (HCC)  Assessment & Plan  Worsening. He says he drinks a 5th to a 1/2 gallon per day. Very high risk for severe complications. Place onto tele and watch close. ciwa protocol.     Normocytic anemia- (present on admission)  Assessment & Plan  Iron studies WNL  Sed rate 36  He is young for this. Possible reactive but not clear  Follow AM CBC    Heroin abuse (HCC)- (present on admission)  Assessment & Plan  IV and IM with abcess now in buttock. He has a murmur.   ECHO wnl  HIV negative    Start methadone 10mg BID       VTE prophylaxis: scd

## 2020-03-05 NOTE — PROGRESS NOTES
"Pharmacy Kinetics 24 y.o. male on vancomycin day # 3 3/5/2020    Currently on Vancomycin 750 mg iv q8hr    Indication for Treatment: Cellulitis of right buttock    Pertinent history per medical record: Admitted on 3/3/2020 for cellulitis of right buttock in an IM, IV heroin user, s/p I and D abscess on 2020. Echo negative for vegetation. Continuing broad spectrum IV antibiotics pending culture results    Other antibiotics: Unasyn 3 gm IV every 6 hours    Allergies: Patient has no known allergies.     List concerns for renal function: None at this time    Pertinent cultures to date:   20 Peripheral BCx C)2 production detected; NOS  3/3 WCx Staph aureus (insufficient colonies to test PBP2a); GNR (no ID yet); GPC in chains  3/4 BCx NGTD        Recent Labs     20  0422 20  0523   WBC 11.5* 8.6 9.0   NEUTSPOLYS 71.00 88.40* 64.40     Recent Labs     20  0422 20  0523   BUN 12 11 10   CREATININE 0.81 0.75 0.71   ALBUMIN 3.7  --  3.0*     Recent Labs     20  1836   VANCOTROUGH 10.9       Intake/Output Summary (Last 24 hours) at 3/5/2020 0758  Last data filed at 3/5/2020 0700  Gross per 24 hour   Intake 2670 ml   Output 850 ml   Net 1820 ml      /46   Pulse 65   Temp 36.9 °C (98.5 °F) (Oral)   Resp 18   Ht 1.753 m (5' 9\")   Wt 50 kg (110 lb 3.7 oz)   SpO2 99%  Temp (24hrs), Av.8 °C (98.2 °F), Min:36.4 °C (97.6 °F), Max:36.9 °C (98.5 °F)      A/P   1. Vancomycin dose: 750 mg every 8 hours (1000, 1800, 0200)  2. Next vancomycin level: 2020 at 0930  3. Goal trough: 12-16 mcg/ml  4. Comments: will monitor levels and adjust regimen per protocol    Kuldip Agarwal - Student Intern    "

## 2020-03-05 NOTE — CARE PLAN
Problem: Nutritional:  Goal: Achieve adequate nutritional intake  Description: Patient will consume >50% of meals and snacks.   Outcome: PROGRESSING AS EXPECTED

## 2020-03-05 NOTE — PROGRESS NOTES
Telemetry Shift Summary    Rhythm SR, SB  HR Range 50-82  Ectopy none  Measurements 0.14/0.10/0.36        Normal Values  Rhythm SR  HR Range    Measurements 0.12-0.20 / 0.06-0.10  / 0.30-0.52

## 2020-03-05 NOTE — CARE PLAN
Problem: Safety  Goal: Will remain free from falls  Outcome: PROGRESSING AS EXPECTED  Note: Patient will be free from injury or fall incident- instruction for use of call light given . Bed alarm on.       Problem: Communication  Goal: The ability to communicate needs accurately and effectively will improve  Outcome: MET     Problem: Bowel/Gastric:  Goal: Will not experience complications related to bowel motility  Note: Encourage Patient to increase fluid intake and to adhere on schedule laxatives/ stool softeners to improve regularity in bowel elimination.

## 2020-03-05 NOTE — DIETARY
"Nutrition services: Day 2 of admit.  Navin Kapoor is a 24 y.o. male with admitting DX of Abscess of right buttock.  S/P I&D.  Pt seen for BMI <19, MST score of 1 per nutrition screen for unplanned weight loss of 2-13 lb x 3 months.    Assessment:  Height: 175.3 cm (5' 9\")  Weight: 50 kg (110 lb 3.7 oz)  Body mass index is 16.28 kg/m²., BMI classification: Underweight  Diet/Intake: Regular.  PO intake 3/4 breakfast <25%, lunch refused, dinner %.  3/5 breakfast %.    Evaluation:   1. History includes heroin and alcohol abuse.  Pt with abscess on buttock from heroin injection, S/P I&D on 3/4.  Pt now with surgical incision on right buttock, seen by wound care.  2. Pt sleepy at time of visit, but provided short answers to questions.  3. Pt states his usual weight is 120-130 lb.  He lost weight over the past couple of months.  Pt with 8.3% weight loss x 3 months which is severe.  4. States he normally eats about 2 meals per day.  Did not feel he was eating less than normal.  5. Pt states appetite is currently good.  States he ate almost all of Breakfast.  Pt states he would like snacks between meals.  Will offer high protein snacks 3 x day between meals.    Malnutrition Risk: Pt with 8.3% weight loss x 3 months per pt report, unable to clarify adequacy of PO intake PTA, though suspect nutritional intake was poor with history of drug and alcohol use.    Recommendations/Plan:  1. Regular diet.  Offer high protein snacks 3 x day between meals.   2. Encourage intake of meals and snacks.  3. Document intake of all meals and snacks as % taken in ADL's to provide interdisciplinary communication across all shifts.   4. Monitor weight.  5. Nutrition rep will continue to see patient for ongoing meal and snack preferences.     RD following.          "

## 2020-03-05 NOTE — PROGRESS NOTES
"No significant changes from AM assessment noted. CIWA scores of 9 and 10 noted so far this shift. Pt continues to receive IV antibiotics without issue. Dressing to buttock dry and intact, small drainage present, dressing to be changed q shift. Pt incontinent of stool this AM, perineal care and linen change performed. Pt requested to have \"an opiate\" today; methadone added per MD order. No other complaints or concerns to note at this time.    "

## 2020-03-05 NOTE — PROGRESS NOTES
Bedside report received from night RN. Assumed care of patient. Daily plan of care discussed. Pt awake and alert, sitting up in bed eating breakfast. Pt denies complaints or concerns at this time. CIWA protocol remains in place, reassessment due by 1000. Hourly rounding in place.

## 2020-03-05 NOTE — CARE PLAN
Problem: Safety  Goal: Will remain free from falls  Outcome: PROGRESSING AS EXPECTED  Intervention: Implement fall precautions  Flowsheets (Taken 3/5/2020 0800)  Environmental Precautions:   Treaded Slipper Socks on Patient   Personal Belongings, Wastebasket, Call Bell etc. in Easy Reach   Report Given to Other Health Care Providers Regarding Fall Risk   Bed in Low Position   Communication Sign for Patients & Families   Mobility Assessed & Appropriate Sign Placed  Note: Environmental fall precautions and hourly rounding in place. Pt instructed to call for assistance before ambulating if needed. Pt verbalized understanding. Pt observed able to ambulate independently without assistance or assistive device needed.         Problem: Knowledge Deficit  Goal: Knowledge of the prescribed therapeutic regimen will improve  Outcome: PROGRESSING AS EXPECTED  Intervention: Discuss information regarding therpeutic regimen and document in education  Note: Pt education given re: currently prescribed medications and antibiotics, including Unasyn and Vancomycin. Pt verbalized understanding.

## 2020-03-06 LAB
BACTERIA WND AEROBE CULT: ABNORMAL
BASOPHILS # BLD AUTO: 0 % (ref 0–1.8)
BASOPHILS # BLD: 0 K/UL (ref 0–0.12)
EOSINOPHIL # BLD AUTO: 0.38 K/UL (ref 0–0.51)
EOSINOPHIL NFR BLD: 5 % (ref 0–6.9)
ERYTHROCYTE [DISTWIDTH] IN BLOOD BY AUTOMATED COUNT: 44.2 FL (ref 35.9–50)
GRAM STN SPEC: ABNORMAL
HCT VFR BLD AUTO: 36 % (ref 42–52)
HGB BLD-MCNC: 11.6 G/DL (ref 14–18)
LYMPHOCYTES # BLD AUTO: 1.98 K/UL (ref 1–4.8)
LYMPHOCYTES NFR BLD: 26 % (ref 22–41)
MANUAL DIFF BLD: NORMAL
MCH RBC QN AUTO: 30.4 PG (ref 27–33)
MCHC RBC AUTO-ENTMCNC: 32.2 G/DL (ref 33.7–35.3)
MCV RBC AUTO: 94.2 FL (ref 81.4–97.8)
MONOCYTES # BLD AUTO: 0.38 K/UL (ref 0–0.85)
MONOCYTES NFR BLD AUTO: 5 % (ref 0–13.4)
MYELOCYTES NFR BLD MANUAL: 2 %
NEUTROPHILS # BLD AUTO: 4.71 K/UL (ref 1.82–7.42)
NEUTROPHILS NFR BLD: 62 % (ref 44–72)
NRBC # BLD AUTO: 0 K/UL
NRBC BLD-RTO: 0 /100 WBC
PLATELET # BLD AUTO: 467 K/UL (ref 164–446)
PLATELET BLD QL SMEAR: NORMAL
PMV BLD AUTO: 9.5 FL (ref 9–12.9)
RBC # BLD AUTO: 3.82 M/UL (ref 4.7–6.1)
RBC BLD AUTO: NORMAL
SIGNIFICANT IND 70042: ABNORMAL
SITE SITE: ABNORMAL
SMUDGE CELLS BLD QL SMEAR: NORMAL
SOURCE SOURCE: ABNORMAL
VANCOMYCIN TROUGH SERPL-MCNC: 13.4 UG/ML (ref 10–20)
WBC # BLD AUTO: 7.6 K/UL (ref 4.8–10.8)

## 2020-03-06 PROCEDURE — 700111 HCHG RX REV CODE 636 W/ 250 OVERRIDE (IP): Performed by: HOSPITALIST

## 2020-03-06 PROCEDURE — 700111 HCHG RX REV CODE 636 W/ 250 OVERRIDE (IP): Performed by: INTERNAL MEDICINE

## 2020-03-06 PROCEDURE — 36415 COLL VENOUS BLD VENIPUNCTURE: CPT

## 2020-03-06 PROCEDURE — 700102 HCHG RX REV CODE 250 W/ 637 OVERRIDE(OP): Performed by: HOSPITALIST

## 2020-03-06 PROCEDURE — 85027 COMPLETE CBC AUTOMATED: CPT

## 2020-03-06 PROCEDURE — 770020 HCHG ROOM/CARE - TELE (206)

## 2020-03-06 PROCEDURE — 80202 ASSAY OF VANCOMYCIN: CPT

## 2020-03-06 PROCEDURE — A9270 NON-COVERED ITEM OR SERVICE: HCPCS | Performed by: INTERNAL MEDICINE

## 2020-03-06 PROCEDURE — A9270 NON-COVERED ITEM OR SERVICE: HCPCS | Performed by: HOSPITALIST

## 2020-03-06 PROCEDURE — 99233 SBSQ HOSP IP/OBS HIGH 50: CPT | Performed by: HOSPITALIST

## 2020-03-06 PROCEDURE — 700102 HCHG RX REV CODE 250 W/ 637 OVERRIDE(OP): Performed by: INTERNAL MEDICINE

## 2020-03-06 PROCEDURE — 700105 HCHG RX REV CODE 258: Performed by: HOSPITALIST

## 2020-03-06 PROCEDURE — 700105 HCHG RX REV CODE 258: Performed by: INTERNAL MEDICINE

## 2020-03-06 PROCEDURE — 85007 BL SMEAR W/DIFF WBC COUNT: CPT

## 2020-03-06 RX ORDER — METHADONE HYDROCHLORIDE 10 MG/1
10 TABLET ORAL ONCE
Status: DISPENSED | OUTPATIENT
Start: 2020-03-06 | End: 2020-03-07

## 2020-03-06 RX ADMIN — AMPICILLIN SODIUM AND SULBACTAM SODIUM 3 G: 2; 1 INJECTION, POWDER, FOR SOLUTION INTRAMUSCULAR; INTRAVENOUS at 11:58

## 2020-03-06 RX ADMIN — AMPICILLIN SODIUM AND SULBACTAM SODIUM 3 G: 2; 1 INJECTION, POWDER, FOR SOLUTION INTRAMUSCULAR; INTRAVENOUS at 18:29

## 2020-03-06 RX ADMIN — AMPICILLIN SODIUM AND SULBACTAM SODIUM 3 G: 2; 1 INJECTION, POWDER, FOR SOLUTION INTRAMUSCULAR; INTRAVENOUS at 00:52

## 2020-03-06 RX ADMIN — FOLIC ACID 1 MG: 1 TABLET ORAL at 05:37

## 2020-03-06 RX ADMIN — LORAZEPAM 1.5 MG: 2 INJECTION INTRAMUSCULAR; INTRAVENOUS at 16:23

## 2020-03-06 RX ADMIN — Medication 400 MG: at 05:37

## 2020-03-06 RX ADMIN — METHADONE HYDROCHLORIDE 10 MG: 10 TABLET ORAL at 05:39

## 2020-03-06 RX ADMIN — LORAZEPAM 1 MG: 1 TABLET ORAL at 02:17

## 2020-03-06 RX ADMIN — METHADONE HYDROCHLORIDE 10 MG: 10 TABLET ORAL at 18:27

## 2020-03-06 RX ADMIN — AMPICILLIN SODIUM AND SULBACTAM SODIUM 3 G: 2; 1 INJECTION, POWDER, FOR SOLUTION INTRAMUSCULAR; INTRAVENOUS at 05:34

## 2020-03-06 RX ADMIN — THERA TABS 1 TABLET: TAB at 05:37

## 2020-03-06 RX ADMIN — Medication 100 MG: at 05:37

## 2020-03-06 RX ADMIN — Medication 400 MG: at 18:27

## 2020-03-06 RX ADMIN — LORAZEPAM 1 MG: 1 TABLET ORAL at 18:27

## 2020-03-06 RX ADMIN — LORAZEPAM 1 MG: 1 TABLET ORAL at 06:32

## 2020-03-06 RX ADMIN — AMPICILLIN SODIUM AND SULBACTAM SODIUM 3 G: 2; 1 INJECTION, POWDER, FOR SOLUTION INTRAMUSCULAR; INTRAVENOUS at 23:26

## 2020-03-06 RX ADMIN — VANCOMYCIN HYDROCHLORIDE 750 MG: 500 INJECTION, POWDER, LYOPHILIZED, FOR SOLUTION INTRAVENOUS at 02:12

## 2020-03-06 RX ADMIN — LORAZEPAM 1 MG: 1 TABLET ORAL at 15:06

## 2020-03-06 RX ADMIN — HYDROMORPHONE HYDROCHLORIDE 0.5 MG: 1 INJECTION, SOLUTION INTRAMUSCULAR; INTRAVENOUS; SUBCUTANEOUS at 14:06

## 2020-03-06 ASSESSMENT — LIFESTYLE VARIABLES
PAROXYSMAL SWEATS: *
PAROXYSMAL SWEATS: NO SWEAT VISIBLE
TOTAL SCORE: 18
PAROXYSMAL SWEATS: BARELY PERCEPTIBLE SWEATING. PALMS MOIST
ANXIETY: *
VISUAL DISTURBANCES: NOT PRESENT
ANXIETY: NO ANXIETY (AT EASE)
TOTAL SCORE: 1
HEADACHE, FULLNESS IN HEAD: NOT PRESENT
ORIENTATION AND CLOUDING OF SENSORIUM: ORIENTED AND CAN DO SERIAL ADDITIONS
TREMOR: TREMOR NOT VISIBLE BUT CAN BE FELT, FINGERTIP TO FINGERTIP
TOTAL SCORE: 9
VISUAL DISTURBANCES: NOT PRESENT
ANXIETY: MILDLY ANXIOUS
TOTAL SCORE: 8
ORIENTATION AND CLOUDING OF SENSORIUM: ORIENTED AND CAN DO SERIAL ADDITIONS
HEADACHE, FULLNESS IN HEAD: NOT PRESENT
ANXIETY: MILDLY ANXIOUS
TREMOR: TREMOR NOT VISIBLE BUT CAN BE FELT, FINGERTIP TO FINGERTIP
AGITATION: SOMEWHAT MORE THAN NORMAL ACTIVITY
NAUSEA AND VOMITING: MILD NAUSEA WITH NO VOMITING
AUDITORY DISTURBANCES: NOT PRESENT
ORIENTATION AND CLOUDING OF SENSORIUM: ORIENTED AND CAN DO SERIAL ADDITIONS
ORIENTATION AND CLOUDING OF SENSORIUM: ORIENTED AND CAN DO SERIAL ADDITIONS
TOTAL SCORE: 8
AUDITORY DISTURBANCES: NOT PRESENT
TREMOR: TREMOR NOT VISIBLE BUT CAN BE FELT, FINGERTIP TO FINGERTIP
TOTAL SCORE: 10
AUDITORY DISTURBANCES: NOT PRESENT
ANXIETY: MILDLY ANXIOUS
NAUSEA AND VOMITING: MILD NAUSEA WITH NO VOMITING
TREMOR: TREMOR NOT VISIBLE BUT CAN BE FELT, FINGERTIP TO FINGERTIP
HEADACHE, FULLNESS IN HEAD: MODERATELY SEVERE
NAUSEA AND VOMITING: MILD NAUSEA WITH NO VOMITING
AUDITORY DISTURBANCES: NOT PRESENT
ORIENTATION AND CLOUDING OF SENSORIUM: ORIENTED AND CAN DO SERIAL ADDITIONS
VISUAL DISTURBANCES: NOT PRESENT
TOTAL SCORE: VERY MILD ITCHING, PINS AND NEEDLES SENSATION, BURNING OR NUMBNESS
AGITATION: NORMAL ACTIVITY
AGITATION: SOMEWHAT MORE THAN NORMAL ACTIVITY
NAUSEA AND VOMITING: MILD NAUSEA WITH NO VOMITING
ORIENTATION AND CLOUDING OF SENSORIUM: ORIENTED AND CAN DO SERIAL ADDITIONS
TREMOR: TREMOR NOT VISIBLE BUT CAN BE FELT, FINGERTIP TO FINGERTIP
NAUSEA AND VOMITING: MILD NAUSEA WITH NO VOMITING
AGITATION: *
TREMOR: TREMOR NOT VISIBLE BUT CAN BE FELT, FINGERTIP TO FINGERTIP
VISUAL DISTURBANCES: NOT PRESENT
HEADACHE, FULLNESS IN HEAD: NOT PRESENT
AGITATION: *
TREMOR: *
TOTAL SCORE: 9
PAROXYSMAL SWEATS: BARELY PERCEPTIBLE SWEATING. PALMS MOIST
NAUSEA AND VOMITING: MILD NAUSEA WITH NO VOMITING
AGITATION: SOMEWHAT MORE THAN NORMAL ACTIVITY
TOTAL SCORE: VERY MILD ITCHING, PINS AND NEEDLES SENSATION, BURNING OR NUMBNESS
NAUSEA AND VOMITING: MILD NAUSEA WITH NO VOMITING
NAUSEA AND VOMITING: NO NAUSEA AND NO VOMITING
AUDITORY DISTURBANCES: NOT PRESENT
TREMOR: MODERATE TREMOR WITH ARMS EXTENDED
AUDITORY DISTURBANCES: NOT PRESENT
VISUAL DISTURBANCES: NOT PRESENT
TREMOR: TREMOR NOT VISIBLE BUT CAN BE FELT, FINGERTIP TO FINGERTIP
VISUAL DISTURBANCES: NOT PRESENT
ORIENTATION AND CLOUDING OF SENSORIUM: ORIENTED AND CAN DO SERIAL ADDITIONS
AUDITORY DISTURBANCES: NOT PRESENT
VISUAL DISTURBANCES: NOT PRESENT
ANXIETY: MILDLY ANXIOUS
AUDITORY DISTURBANCES: NOT PRESENT
AGITATION: NORMAL ACTIVITY
NAUSEA AND VOMITING: NO NAUSEA AND NO VOMITING
PAROXYSMAL SWEATS: BARELY PERCEPTIBLE SWEATING. PALMS MOIST
HEADACHE, FULLNESS IN HEAD: MODERATELY SEVERE
HEADACHE, FULLNESS IN HEAD: MODERATE
HEADACHE, FULLNESS IN HEAD: MODERATELY SEVERE
ANXIETY: *
AGITATION: SOMEWHAT MORE THAN NORMAL ACTIVITY
ANXIETY: MILDLY ANXIOUS
PAROXYSMAL SWEATS: BARELY PERCEPTIBLE SWEATING. PALMS MOIST
PAROXYSMAL SWEATS: BARELY PERCEPTIBLE SWEATING. PALMS MOIST
VISUAL DISTURBANCES: NOT PRESENT
PAROXYSMAL SWEATS: *
TOTAL SCORE: 9
HEADACHE, FULLNESS IN HEAD: MODERATE
ANXIETY: MODERATELY ANXIOUS OR GUARDED, SO ANXIETY IS INFERRED
AGITATION: *
VISUAL DISTURBANCES: NOT PRESENT
PAROXYSMAL SWEATS: BARELY PERCEPTIBLE SWEATING. PALMS MOIST
TOTAL SCORE: 10
AUDITORY DISTURBANCES: NOT PRESENT
HEADACHE, FULLNESS IN HEAD: MODERATE

## 2020-03-06 ASSESSMENT — ENCOUNTER SYMPTOMS
FEVER: 0
DIZZINESS: 0
BACK PAIN: 0
HEMOPTYSIS: 0
NAUSEA: 0
CHILLS: 0
COUGH: 0
NECK PAIN: 0
DIARRHEA: 0
SPUTUM PRODUCTION: 0
ABDOMINAL PAIN: 0
TINGLING: 0
MYALGIAS: 1
VOMITING: 0
HEADACHES: 0
CONSTIPATION: 0
SHORTNESS OF BREATH: 0
PALPITATIONS: 0

## 2020-03-06 NOTE — PROGRESS NOTES
Dressing to right buttock changed, moderate amount serosanguinous drainage to old dressing and packing strip.  Wound bed appears beefy red and no odor noted.  Wound packed gently with packing strip and covered with foam dressing.  Patient tolerated well, with Dilaudid premed.  Bed alarm remains on and  remains on.

## 2020-03-06 NOTE — PROGRESS NOTES
Assumed patient at 1900.  Patient is alert and oriented at this time but sleepy at the beginning of shift.  Dilaudid IV given for dressing change.   remains on and oxygen 98% on room air.  CIWA continues.

## 2020-03-06 NOTE — PROGRESS NOTES
[facility] Medicine Daily Progress Note    Date of Service  3/6/2020    Chief Complaint  24 y.o. male admitted 3/3/2020 with buttock pain.     Hospital Course    He was found to have a right buttock abscess. He has been using IV heroin and alcohol. His hospital course was complicated by alcohol withdrawal which he was also treated for. Surgery was consulted and he was taken to the OR for I&D. He was placed onto IV antibiotics. He was noted to have a murmur. An echocardiogram and blood cultures were obtained.       Interval Problem Update  Continues to sleep all day, responding favorably to methadone and ativan  No CP or SOB  Continue vanc for now, awaiting final cultures  If MSSA can DC vanc  Denies fevers overnight, however fell back asleep almost immediately    Consultants/Specialty  surgery    Code Status  full    Disposition  Tbd  **Call [place] PD before discharging as he is to go to [facility] once medically stable, per Jeannette Duong phone number [phone]    Review of Systems  Review of Systems   Constitutional: Positive for malaise/fatigue. Negative for chills and fever.   Respiratory: Negative for cough, hemoptysis, sputum production and shortness of breath.    Cardiovascular: Negative for chest pain and palpitations.   Gastrointestinal: Negative for abdominal pain, constipation, diarrhea, nausea and vomiting.   Musculoskeletal: Positive for myalgias. Negative for back pain and neck pain.        Buttock pain   Skin: Negative for itching and rash.   Neurological: Negative for dizziness, tingling and headaches.   All other systems reviewed and are negative.     Physical Exam  Temp:  [36.4 °C (97.6 °F)-37.1 °C (98.7 °F)] 37.1 °C (98.7 °F)  Pulse:  [60-88] 62  Resp:  [18-20] 18  BP: ()/(43-58) 100/45  SpO2:  [94 %-97 %] 96 %    Physical Exam  Vitals signs and nursing note reviewed.   Constitutional:       General: He is not in acute distress.     Appearance: He is ill-appearing. He is not diaphoretic.   HENT:       Head: Normocephalic and atraumatic.      Nose: No congestion or rhinorrhea.      Mouth/Throat:      Mouth: Mucous membranes are moist.      Pharynx: Oropharynx is clear.   Eyes:      General: No scleral icterus.        Right eye: No discharge.         Left eye: No discharge.      Extraocular Movements: Extraocular movements intact.      Conjunctiva/sclera: Conjunctivae normal.   Neck:      Musculoskeletal: Normal range of motion. No neck rigidity.   Cardiovascular:      Rate and Rhythm: Normal rate.      Pulses: Normal pulses.   Pulmonary:      Effort: Pulmonary effort is normal. No respiratory distress.      Breath sounds: No wheezing.   Abdominal:      General: Abdomen is flat. There is no distension.      Palpations: Abdomen is soft.      Tenderness: There is no abdominal tenderness.   Musculoskeletal: Normal range of motion.   Skin:     General: Skin is warm.      Coloration: Skin is pale. Skin is not jaundiced.   Neurological:      General: No focal deficit present.      Mental Status: He is alert.      Cranial Nerves: No cranial nerve deficit.   Psychiatric:      Comments: Lethargic  Flat affect       Fluids    Intake/Output Summary (Last 24 hours) at 3/6/2020 1045  Last data filed at 3/6/2020 0721  Gross per 24 hour   Intake 480 ml   Output 1400 ml   Net -920 ml       Laboratory  Recent Labs     03/04/20  0422 03/05/20  0523 03/06/20  0428   WBC 8.6 9.0 7.6   RBC 4.00* 3.54* 3.82*   HEMOGLOBIN 12.2* 10.6* 11.6*   HEMATOCRIT 37.8* 33.6* 36.0*   MCV 94.5 94.9 94.2   MCH 30.5 29.9 30.4   MCHC 32.3* 31.5* 32.2*   RDW 43.6 44.0 44.2   PLATELETCT 441 400 467*   MPV 9.5 9.4 9.5     Recent Labs     03/03/20  1818 03/04/20  0422 03/05/20  0523   SODIUM 139 139 143   POTASSIUM 4.4 4.4 4.0   CHLORIDE 98 104 109   CO2 26 24 24   GLUCOSE 94 146* 94   BUN 12 11 10   CREATININE 0.81 0.75 0.71   CALCIUM 8.9 8.9 8.3*                   Imaging  EC-ECHOCARDIOGRAM COMPLETE W/O CONT   Final Result      CT-PELVIS WITH   Final  Result      1.  Medial RIGHT buttock subcutaneous abscess measuring 7.2 cm with extensive surrounding inflammatory changes.  Inflammation extends to the underlying muscle without definite intramuscular extension of abscess.   2.  Multiple small inflammatory foci within the LEFT medial gluteal subcutaneous soft tissues.   3.  Increased colonic stool suggesting constipation.           Assessment/Plan  * Abscess of right buttock- (present on admission)  Assessment & Plan  7.2cm on CT scan  S/p I&D with Dr. Hinton    Continue wound care  I am monitoring vancomycin toxicity and therapeutics    Results     Procedure Component Value Units Date/Time    Anaerobic Culture [[id number]] Collected:  03/03/20 2055    Order Status:  Completed Specimen:  Wound Updated:  03/05/20 1559     Significant Indicator NEG     Source WND     Site BUTTOCK     Culture Result Moderate growth possible anaerobes;  further incubation required.      Narrative:       Buttock Body Fluid  Description: Right Buttock abscess  Surgery - swabs received    CULTURE WOUND W/ GRAM STAIN [[id number]]  (Abnormal) Collected:  03/03/20 2055    Order Status:  Completed Specimen:  Wound from Buttock Updated:  03/05/20 1559     Significant Indicator POS     Source WND     Site BUTTOCK     Culture Result -     Gram Stain Result Moderate WBCs.  Moderate Gram positive cocci in chains       Culture Result Staphylococcus aureus  Rare growth        Haemophilus parainfluenzae (Beta-lactamase negative)  Moderate growth      Narrative:       Buttock Body Fluid  Description: Right Buttock abscess  Surgery - swabs received    Blood Culture [[id number]] Collected:  03/04/20 1836    Order Status:  Completed Specimen:  Blood Updated:  03/05/20 0551     Significant Indicator NEG     Source BLD     Site -     Culture Result No Growth  Note: Blood cultures are incubated for 5 days and  are monitored continuously.Positive blood cultures  are called to the RN and reported as soon as  they  "are identified.  Blood culture testing and Gram stain, if indicated, are  performed at [facility], [address] [place] [address].  Positive blood cultures are  sent to [facility], [address] [place] [address], for organism identification and  susceptibility testing.      Narrative:       Left Hand    BLOOD CULTURE [[id number]] Collected:  03/04/20 1157    Order Status:  Completed Specimen:  Blood from Peripheral Updated:  03/05/20 0551     Significant Indicator NEG     Source BLD     Site PERIPHERAL     Culture Result No Growth  Note: Blood cultures are incubated for 5 days and  are monitored continuously.Positive blood cultures  are called to the RN and reported as soon as  they are identified.  Blood culture testing and Gram stain, if indicated, are  performed at [facility], [address] [place] [address].  Positive blood cultures are  sent to [facility], [address] [place] [address], for organism identification and  susceptibility testing.      Narrative:       Per Hospital Policy: Only change Specimen Src: to \"Line\" if  specified by physician order.  Right Hand    BLOOD CULTURE [[id number]] Collected:  03/04/20 1830    Order Status:  Canceled Specimen:  Other from Peripheral     BLOOD CULTURE x2 [[id number]]  (Abnormal) Collected:  03/03/20 1818    Order Status:  Completed Specimen:  Blood from Peripheral Updated:  03/04/20 1707     Significant Indicator POS     Source BLD     Site PERIPHERAL     Culture Result A significant status has been triggered by the BACTEC  instrument due to an increase in CO2 production.  Gram  stain of blood culture bottle shows NO ORGANISMS SEEN.  Further investigation is in progress.  Note: Blood cultures are incubated for 5 days and  are monitored continuously. Positive blood cultures  are called to the RN and reported as soon as  they are identified.      Narrative:       Per " "Hospital Policy: Only change Specimen Src: to \"Line\" if  specified by physician order.  Left AC    BLOOD CULTURE x2 [[id number]] Collected:  03/03/20 1818    Order Status:  Completed Specimen:  Blood from Peripheral Updated:  03/04/20 1656     Significant Indicator NEG     Source BLD     Site PERIPHERAL     Culture Result A significant status has been triggered by the BACTEC  instrument due to an increase in CO2 production.  Gram  stain of blood culture bottle shows NO ORGANISMS SEEN.  Further investigation is in progress.  Note: Blood cultures are incubated for 5 days and  are monitored continuously. Positive blood cultures  are called to the RN and reported as soon as  they are identified.      Narrative:       Per Hospital Policy: Only change Specimen Src: to \"Line\" if  specified by physician order.  Right AC    GRAM STAIN [[id number]] Collected:  03/03/20 2055    Order Status:  Completed Specimen:  Wound Updated:  03/04/20 1546     Significant Indicator .     Source WND     Site BUTTOCK     Gram Stain Result Moderate WBCs.  Moderate Gram positive cocci in chains      Narrative:       Buttock Body Fluid  Description: Right Buttock abscess  Surgery - swabs received            Alcohol withdrawal (HCC)  Assessment & Plan  Still scoring 10-12 on CIWA  He says he drinks a 5th to a 1/2 gallon per day.   Very high risk for severe complications  Needs to be monitored closely    Normocytic anemia- (present on admission)  Assessment & Plan  Iron studies WNL  Sed rate 36  He is young for this. Possible reactive but not clear  Follow AM CBC    Heroin abuse (HCC)- (present on admission)  Assessment & Plan  IV and IM with abcess now in buttock. He has a murmur.   ECHO wnl  HIV negative    Start methadone 10mg BID       VTE prophylaxis: scd      "

## 2020-03-06 NOTE — PROGRESS NOTES
Telemetry Shift Summary    Rhythm SR  HR Range 60s-90s  Ectopy none  Measurements 0.16/0.08/0.42        Normal Values  Rhythm SR  HR Range    Measurements 0.12-0.20 / 0.06-0.10  / 0.30-0.52

## 2020-03-06 NOTE — PROGRESS NOTES
Patient scored 8-10 on CIWA score during the night.  Patient able to take oral Ativan without difficulty.  Patient remains pleasant and cooperative.

## 2020-03-06 NOTE — PROGRESS NOTES
Bedside report received from night RN. Assumed care of patient. Daily plan of care discussed. Pt resting comfortably in bed,  in place. Respirations even and unlabored. Pt wakes easily to voice and touch. Hourly rounding in place.

## 2020-03-06 NOTE — PROGRESS NOTES
Telemetry Shift Summary    Rhythm SR  HR Range 60-70  Ectopy R couplets  Measurements .18/.10/.40        Normal Values  Rhythm SR  HR Range    Measurements 0.12-0.20 / 0.06-0.10  / 0.30-0.52

## 2020-03-07 LAB
BACTERIA SPEC ANAEROBE CULT: ABNORMAL
BACTERIA SPEC ANAEROBE CULT: ABNORMAL
BASOPHILS # BLD AUTO: 0 % (ref 0–1.8)
BASOPHILS # BLD: 0 K/UL (ref 0–0.12)
EOSINOPHIL # BLD AUTO: 0.89 K/UL (ref 0–0.51)
EOSINOPHIL NFR BLD: 8 % (ref 0–6.9)
ERYTHROCYTE [DISTWIDTH] IN BLOOD BY AUTOMATED COUNT: 43.6 FL (ref 35.9–50)
HCT VFR BLD AUTO: 44.2 % (ref 42–52)
HGB BLD-MCNC: 14.3 G/DL (ref 14–18)
LYMPHOCYTES # BLD AUTO: 2.22 K/UL (ref 1–4.8)
LYMPHOCYTES NFR BLD: 20 % (ref 22–41)
MANUAL DIFF BLD: NORMAL
MCH RBC QN AUTO: 30.7 PG (ref 27–33)
MCHC RBC AUTO-ENTMCNC: 32.4 G/DL (ref 33.7–35.3)
MCV RBC AUTO: 94.8 FL (ref 81.4–97.8)
METAMYELOCYTES NFR BLD MANUAL: 1 %
MONOCYTES # BLD AUTO: 1.11 K/UL (ref 0–0.85)
MONOCYTES NFR BLD AUTO: 10 % (ref 0–13.4)
MYELOCYTES NFR BLD MANUAL: 2 %
NEUTROPHILS # BLD AUTO: 6.55 K/UL (ref 1.82–7.42)
NEUTROPHILS NFR BLD: 59 % (ref 44–72)
NRBC # BLD AUTO: 0 K/UL
NRBC BLD-RTO: 0 /100 WBC
PLATELET # BLD AUTO: 568 K/UL (ref 164–446)
PLATELET BLD QL SMEAR: NORMAL
PMV BLD AUTO: 9.1 FL (ref 9–12.9)
RBC # BLD AUTO: 4.66 M/UL (ref 4.7–6.1)
RBC BLD AUTO: NORMAL
SIGNIFICANT IND 70042: ABNORMAL
SITE SITE: ABNORMAL
SOURCE SOURCE: ABNORMAL
TOXIC GRANULES BLD QL SMEAR: NORMAL
WBC # BLD AUTO: 11.1 K/UL (ref 4.8–10.8)

## 2020-03-07 PROCEDURE — 700102 HCHG RX REV CODE 250 W/ 637 OVERRIDE(OP): Performed by: INTERNAL MEDICINE

## 2020-03-07 PROCEDURE — A9270 NON-COVERED ITEM OR SERVICE: HCPCS | Performed by: INTERNAL MEDICINE

## 2020-03-07 PROCEDURE — 700111 HCHG RX REV CODE 636 W/ 250 OVERRIDE (IP): Performed by: HOSPITALIST

## 2020-03-07 PROCEDURE — 36415 COLL VENOUS BLD VENIPUNCTURE: CPT

## 2020-03-07 PROCEDURE — 700105 HCHG RX REV CODE 258: Performed by: INTERNAL MEDICINE

## 2020-03-07 PROCEDURE — 85027 COMPLETE CBC AUTOMATED: CPT

## 2020-03-07 PROCEDURE — 99233 SBSQ HOSP IP/OBS HIGH 50: CPT | Performed by: HOSPITALIST

## 2020-03-07 PROCEDURE — 700111 HCHG RX REV CODE 636 W/ 250 OVERRIDE (IP): Performed by: INTERNAL MEDICINE

## 2020-03-07 PROCEDURE — 700102 HCHG RX REV CODE 250 W/ 637 OVERRIDE(OP): Performed by: HOSPITALIST

## 2020-03-07 PROCEDURE — 85007 BL SMEAR W/DIFF WBC COUNT: CPT

## 2020-03-07 PROCEDURE — A9270 NON-COVERED ITEM OR SERVICE: HCPCS | Performed by: HOSPITALIST

## 2020-03-07 PROCEDURE — 770020 HCHG ROOM/CARE - TELE (206)

## 2020-03-07 RX ORDER — METHADONE HYDROCHLORIDE 10 MG/1
10 TABLET ORAL EVERY EVENING
Status: DISCONTINUED | OUTPATIENT
Start: 2020-03-07 | End: 2020-03-09

## 2020-03-07 RX ORDER — METHADONE HYDROCHLORIDE 10 MG/1
20 TABLET ORAL DAILY
Status: DISCONTINUED | OUTPATIENT
Start: 2020-03-08 | End: 2020-03-09

## 2020-03-07 RX ORDER — AMOXICILLIN AND CLAVULANATE POTASSIUM 875; 125 MG/1; MG/1
1 TABLET, FILM COATED ORAL EVERY 12 HOURS
Status: DISCONTINUED | OUTPATIENT
Start: 2020-03-07 | End: 2020-03-10 | Stop reason: HOSPADM

## 2020-03-07 RX ORDER — METHADONE HYDROCHLORIDE 10 MG/1
10 TABLET ORAL ONCE
Status: COMPLETED | OUTPATIENT
Start: 2020-03-07 | End: 2020-03-07

## 2020-03-07 RX ADMIN — AMOXICILLIN AND CLAVULANATE POTASSIUM 1 TABLET: 875; 125 TABLET, FILM COATED ORAL at 10:39

## 2020-03-07 RX ADMIN — LORAZEPAM 1 MG: 1 TABLET ORAL at 10:39

## 2020-03-07 RX ADMIN — THERA TABS 1 TABLET: TAB at 04:24

## 2020-03-07 RX ADMIN — Medication 100 MG: at 04:25

## 2020-03-07 RX ADMIN — AMOXICILLIN AND CLAVULANATE POTASSIUM 1 TABLET: 875; 125 TABLET, FILM COATED ORAL at 18:11

## 2020-03-07 RX ADMIN — LORAZEPAM 1 MG: 1 TABLET ORAL at 22:46

## 2020-03-07 RX ADMIN — LORAZEPAM 1 MG: 1 TABLET ORAL at 14:26

## 2020-03-07 RX ADMIN — SENNOSIDES AND DOCUSATE SODIUM 2 TABLET: 8.6; 5 TABLET ORAL at 04:32

## 2020-03-07 RX ADMIN — LORAZEPAM 1 MG: 1 TABLET ORAL at 18:10

## 2020-03-07 RX ADMIN — METHADONE HYDROCHLORIDE 10 MG: 10 TABLET ORAL at 04:32

## 2020-03-07 RX ADMIN — METHADONE HYDROCHLORIDE 10 MG: 10 TABLET ORAL at 18:11

## 2020-03-07 RX ADMIN — METHADONE HYDROCHLORIDE 10 MG: 10 TABLET ORAL at 10:39

## 2020-03-07 RX ADMIN — AMPICILLIN SODIUM AND SULBACTAM SODIUM 3 G: 2; 1 INJECTION, POWDER, FOR SOLUTION INTRAMUSCULAR; INTRAVENOUS at 05:45

## 2020-03-07 RX ADMIN — Medication 400 MG: at 18:10

## 2020-03-07 RX ADMIN — Medication 400 MG: at 04:25

## 2020-03-07 RX ADMIN — FOLIC ACID 1 MG: 1 TABLET ORAL at 04:24

## 2020-03-07 RX ADMIN — HYDROMORPHONE HYDROCHLORIDE 0.5 MG: 1 INJECTION, SOLUTION INTRAMUSCULAR; INTRAVENOUS; SUBCUTANEOUS at 15:52

## 2020-03-07 ASSESSMENT — LIFESTYLE VARIABLES
PAROXYSMAL SWEATS: NO SWEAT VISIBLE
ANXIETY: *
AGITATION: NORMAL ACTIVITY
VISUAL DISTURBANCES: NOT PRESENT
NAUSEA AND VOMITING: *
AGITATION: SOMEWHAT MORE THAN NORMAL ACTIVITY
AUDITORY DISTURBANCES: NOT PRESENT
ANXIETY: MILDLY ANXIOUS
ANXIETY: *
NAUSEA AND VOMITING: MILD NAUSEA WITH NO VOMITING
ORIENTATION AND CLOUDING OF SENSORIUM: ORIENTED AND CAN DO SERIAL ADDITIONS
AUDITORY DISTURBANCES: NOT PRESENT
AUDITORY DISTURBANCES: NOT PRESENT
TOTAL SCORE: 10
HEADACHE, FULLNESS IN HEAD: NOT PRESENT
TREMOR: NO TREMOR
HEADACHE, FULLNESS IN HEAD: SEVERE
TREMOR: TREMOR NOT VISIBLE BUT CAN BE FELT, FINGERTIP TO FINGERTIP
TOTAL SCORE: 0
TREMOR: NO TREMOR
PAROXYSMAL SWEATS: BARELY PERCEPTIBLE SWEATING. PALMS MOIST
NAUSEA AND VOMITING: NO NAUSEA AND NO VOMITING
VISUAL DISTURBANCES: NOT PRESENT
ORIENTATION AND CLOUDING OF SENSORIUM: ORIENTED AND CAN DO SERIAL ADDITIONS
PAROXYSMAL SWEATS: BARELY PERCEPTIBLE SWEATING. PALMS MOIST
HEADACHE, FULLNESS IN HEAD: NOT PRESENT
TOTAL SCORE: 9
VISUAL DISTURBANCES: NOT PRESENT
AGITATION: NORMAL ACTIVITY
ANXIETY: MILDLY ANXIOUS
AGITATION: SOMEWHAT MORE THAN NORMAL ACTIVITY
VISUAL DISTURBANCES: NOT PRESENT
TOTAL SCORE: 1
ANXIETY: NO ANXIETY (AT EASE)
PAROXYSMAL SWEATS: BARELY PERCEPTIBLE SWEATING. PALMS MOIST
AGITATION: SOMEWHAT MORE THAN NORMAL ACTIVITY
ORIENTATION AND CLOUDING OF SENSORIUM: ORIENTED AND CAN DO SERIAL ADDITIONS
TOTAL SCORE: MILD ITCHING, PINS AND NEEDLES SENSATION, BURNING OR NUMBNESS
PAROXYSMAL SWEATS: BARELY PERCEPTIBLE SWEATING. PALMS MOIST
TREMOR: NO TREMOR
NAUSEA AND VOMITING: NO NAUSEA AND NO VOMITING
TREMOR: NO TREMOR
HEADACHE, FULLNESS IN HEAD: MODERATELY SEVERE
AUDITORY DISTURBANCES: NOT PRESENT
VISUAL DISTURBANCES: NOT PRESENT
ORIENTATION AND CLOUDING OF SENSORIUM: ORIENTED AND CAN DO SERIAL ADDITIONS
VISUAL DISTURBANCES: NOT PRESENT
NAUSEA AND VOMITING: NO NAUSEA AND NO VOMITING
NAUSEA AND VOMITING: MILD NAUSEA WITH NO VOMITING
AUDITORY DISTURBANCES: NOT PRESENT
TOTAL SCORE: 10
AUDITORY DISTURBANCES: NOT PRESENT
HEADACHE, FULLNESS IN HEAD: MODERATE
ORIENTATION AND CLOUDING OF SENSORIUM: ORIENTED AND CAN DO SERIAL ADDITIONS
TOTAL SCORE: 9
AGITATION: SOMEWHAT MORE THAN NORMAL ACTIVITY
SUBSTANCE_ABUSE: 1
PAROXYSMAL SWEATS: BARELY PERCEPTIBLE SWEATING. PALMS MOIST
HEADACHE, FULLNESS IN HEAD: SEVERE
TREMOR: NO TREMOR
ANXIETY: NO ANXIETY (AT EASE)
ORIENTATION AND CLOUDING OF SENSORIUM: ORIENTED AND CAN DO SERIAL ADDITIONS

## 2020-03-07 ASSESSMENT — ENCOUNTER SYMPTOMS
PALPITATIONS: 0
HEADACHES: 0
FEVER: 0
CHILLS: 0
CONSTIPATION: 0
NECK PAIN: 0
VOMITING: 0
NAUSEA: 0
ABDOMINAL PAIN: 0
SHORTNESS OF BREATH: 0
SPUTUM PRODUCTION: 0
BACK PAIN: 1
COUGH: 0
DIZZINESS: 0
DIARRHEA: 0
WHEEZING: 0
TINGLING: 0
HEMOPTYSIS: 0
NERVOUS/ANXIOUS: 1
MYALGIAS: 1

## 2020-03-07 NOTE — CARE PLAN
"  Problem: Skin Integrity  Goal: Risk for impaired skin integrity will decrease  Outcome: PROGRESSING AS EXPECTED  Intervention: Implement precautions to protect skin integrity in collaboration with the interdisciplinary team  Flowsheets (Taken 3/6/2020 1000)  Skin Preventative Measures: Pillows in Use for Support / Positioning  Bed Types: Pressure Redistribution Mattress (Atmosair)  Note: Dressing changes performed q shift. Pt able to turn self side-to-side and ambulate if needed. Pt occasionally moist, bed linens changed as needed.     Problem: Psychosocial Needs:  Goal: Level of anxiety will decrease  Outcome: PROGRESSING SLOWER THAN EXPECTED  Intervention: Collaborate with Interdisciplinary Team including Psychologist/Behavioral Health Team  Note: Pt became extremely restless and anxious this shift, requesting \"an opiate, anything with an opiate\" from nursing staff. Pt verbalizes anxiety relief with administration of Ativan per CIWA protocol.     "

## 2020-03-07 NOTE — CARE PLAN
Problem: Bowel/Gastric:  Goal: Will not experience complications related to bowel motility  Outcome: PROGRESSING AS EXPECTED  Intervention: Implement interventions to promote bowel evacuation if inadequate bowel movements in past 48 hours  Note: Pt continues to have regular bowel movements each shift, denies difficulty. No signs of constipation observed.     Problem: Knowledge Deficit  Goal: Knowledge of the prescribed therapeutic regimen will improve  Outcome: PROGRESSING AS EXPECTED  Intervention: Discuss information regarding therpeutic regimen and document in education  Note: Pt education given re: prescribed methadone dosage and prn lorazepam. Pt verbalized understanding. Education documented appropriately.

## 2020-03-07 NOTE — PROGRESS NOTES
[facility] Medicine Daily Progress Note    Date of Service  3/7/2020    Chief Complaint  24 y.o. male admitted 3/3/2020 with buttock pain.     Hospital Course    He was found to have a right buttock abscess. He has been using IV heroin and alcohol. His hospital course was complicated by alcohol withdrawal which he was also treated for. Surgery was consulted and he was taken to the OR for I&D. He was placed onto IV antibiotics. He was noted to have a murmur. An echocardiogram and blood cultures were obtained.       Interval Problem Update  Continues to sleep all day although reports worsening pain in the morning  Increase methadone a.m. dose  No CP or SOB  Transition to p.o. antibiotics    Denies fevers overnight, however fell back asleep almost immediately  Will likely need to stay in-house until wound better healed    Consultants/Specialty  surgery    Code Status  full    Disposition  Tbd  **Call [place] [employer] before discharging as he is to go to retirement once medically stable, per Jeannette Duong phone number [phone]    Review of Systems  Review of Systems   Constitutional: Positive for malaise/fatigue. Negative for chills and fever.   Respiratory: Negative for cough, hemoptysis, sputum production, shortness of breath and wheezing.    Cardiovascular: Negative for chest pain and palpitations.   Gastrointestinal: Negative for abdominal pain, constipation, diarrhea, nausea and vomiting.   Musculoskeletal: Positive for back pain, joint pain and myalgias. Negative for neck pain.        Buttock pain   Skin: Negative for itching and rash.   Neurological: Negative for dizziness, tingling and headaches.   Psychiatric/Behavioral: Positive for substance abuse. The patient is nervous/anxious.    All other systems reviewed and are negative.     Physical Exam  Temp:  [36.7 °C (98.1 °F)-36.9 °C (98.5 °F)] 36.7 °C (98.1 °F)  Pulse:  [60-82] 77  Resp:  [16-18] 16  BP: ()/(41-70) 98/53  SpO2:  [93 %-98 %] 97 %    Physical  Exam  Vitals signs and nursing note reviewed.   Constitutional:       General: He is not in acute distress.     Appearance: He is ill-appearing. He is not diaphoretic.   HENT:      Head: Normocephalic and atraumatic.      Nose: No congestion or rhinorrhea.      Mouth/Throat:      Mouth: Mucous membranes are moist.      Pharynx: Oropharynx is clear.   Eyes:      General: No scleral icterus.        Right eye: No discharge.         Left eye: No discharge.      Extraocular Movements: Extraocular movements intact.      Conjunctiva/sclera: Conjunctivae normal.   Neck:      Musculoskeletal: Normal range of motion. No neck rigidity.   Cardiovascular:      Rate and Rhythm: Normal rate.      Pulses: Normal pulses.   Pulmonary:      Effort: Pulmonary effort is normal. No respiratory distress.      Breath sounds: No wheezing.   Abdominal:      General: Abdomen is flat. There is no distension.      Palpations: Abdomen is soft.      Tenderness: There is no abdominal tenderness.   Musculoskeletal: Normal range of motion.         General: Tenderness present.   Skin:     General: Skin is warm.      Coloration: Skin is pale. Skin is not jaundiced.      Comments: Left buttock wound bandage clean dry intact   Neurological:      General: No focal deficit present.      Mental Status: He is alert.      Cranial Nerves: No cranial nerve deficit.      Motor: No weakness.   Psychiatric:      Comments: Lethargic  Flat affect       Fluids    Intake/Output Summary (Last 24 hours) at 3/7/2020 1520  Last data filed at 3/7/2020 0931  Gross per 24 hour   Intake 510 ml   Output 950 ml   Net -440 ml       Laboratory  Recent Labs     03/05/20  0523 03/06/20  0428 03/07/20  0424   WBC 9.0 7.6 11.1*   RBC 3.54* 3.82* 4.66*   HEMOGLOBIN 10.6* 11.6* 14.3   HEMATOCRIT 33.6* 36.0* 44.2   MCV 94.9 94.2 94.8   MCH 29.9 30.4 30.7   MCHC 31.5* 32.2* 32.4*   RDW 44.0 44.2 43.6   PLATELETCT 400 467* 568*   MPV 9.4 9.5 9.1     Recent Labs     03/05/20  0523    SODIUM 143   POTASSIUM 4.0   CHLORIDE 109   CO2 24   GLUCOSE 94   BUN 10   CREATININE 0.71   CALCIUM 8.3*                   Imaging  EC-ECHOCARDIOGRAM COMPLETE W/O CONT   Final Result      CT-PELVIS WITH   Final Result      1.  Medial RIGHT buttock subcutaneous abscess measuring 7.2 cm with extensive surrounding inflammatory changes.  Inflammation extends to the underlying muscle without definite intramuscular extension of abscess.   2.  Multiple small inflammatory foci within the LEFT medial gluteal subcutaneous soft tissues.   3.  Increased colonic stool suggesting constipation.           Assessment/Plan  * Abscess of right buttock- (present on admission)  Assessment & Plan  7.2cm on CT scan  S/p I&D with Dr. Hinton      Results     Procedure Component Value Units Date/Time    Anaerobic Culture [[id number]]  (Abnormal) Collected:  03/03/20 2055    Order Status:  Completed Specimen:  Wound Updated:  03/07/20 1445     Significant Indicator POS     Source WND     Site BUTTOCK     Culture Result Growth noted after further incubation, see below for  organism identification.        Streptococcus intermedius  Heavy growth      Narrative:       Buttock Body Fluid  Description: Right Buttock abscess  Surgery - swabs received    CULTURE WOUND W/ GRAM STAIN [[id number]]  (Abnormal)  (Susceptibility) Collected:  03/03/20 2055    Order Status:  Completed Specimen:  Wound from Buttock Updated:  03/07/20 1445     Significant Indicator POS     Source WND     Site BUTTOCK     Culture Result -     Gram Stain Result Moderate WBCs.  Moderate Gram positive cocci in chains       Culture Result Staphylococcus aureus  Rare growth        Haemophilus parainfluenzae (Beta-lactamase negative)  Moderate growth        Viridans Streptococcus  Moderate growth      Narrative:       Buttock Body Fluid  Description: Right Buttock abscess  Surgery - swabs received    Susceptibility     Staphylococcus aureus (1)     Antibiotic Interpretation Microscan  Method Status    Azithromycin Sensitive <=2 mcg/mL MACK Final    Clindamycin Sensitive <=0.5 mcg/mL MACK Final    Cefazolin Sensitive <=8 mcg/mL MACK Final    Ceftaroline Sensitive <=0.5 mcg/mL MACK Final    Daptomycin Sensitive <=1 mcg/mL MACK Final    Ampicillin/sulbactam Sensitive <=8/4 mcg/mL MACK Final    Erythromycin Sensitive <=0.25 mcg/mL MACK Final    Vancomycin Sensitive 1 mcg/mL MACK Final    Oxacillin Sensitive <=0.25 mcg/mL MACK Final    Pip/Tazobactam Sensitive <=4 mcg/mL MACK Final    Trimeth/Sulfa Sensitive <=0.5/9.5 mcg/mL MACK Final    Tetracycline Sensitive <=4 mcg/mL MACK Final                   Blood Culture [[id number]] Collected:  03/04/20 1836    Order Status:  Completed Specimen:  Blood Updated:  03/05/20 0551     Significant Indicator NEG     Source BLD     Site -     Culture Result No Growth  Note: Blood cultures are incubated for 5 days and  are monitored continuously.Positive blood cultures  are called to the RN and reported as soon as  they are identified.  Blood culture testing and Gram stain, if indicated, are  performed at [facility], [address] [place] [address].  Positive blood cultures are  sent to [facility], [address] [place] [address], for organism identification and  susceptibility testing.      Narrative:       Left Hand    BLOOD CULTURE [[id number]] Collected:  03/04/20 1157    Order Status:  Completed Specimen:  Blood from Peripheral Updated:  03/05/20 0551     Significant Indicator NEG     Source BLD     Site PERIPHERAL     Culture Result No Growth  Note: Blood cultures are incubated for 5 days and  are monitored continuously.Positive blood cultures  are called to the RN and reported as soon as  they are identified.  Blood culture testing and Gram stain, if indicated, are  performed at [facility], [facility]  [facility].[place], [place].  Positive blood cultures are  sent to [facility],  "[address] [place] [address], for organism identification and  susceptibility testing.      Narrative:       Per Hospital Policy: Only change Specimen Src: to \"Line\" if  specified by physician order.  Right Hand    BLOOD CULTURE [[id number]] Collected:  03/04/20 1830    Order Status:  Canceled Specimen:  Other from Peripheral     BLOOD CULTURE x2 [[id number]]  (Abnormal) Collected:  03/03/20 1818    Order Status:  Completed Specimen:  Blood from Peripheral Updated:  03/04/20 1707     Significant Indicator POS     Source BLD     Site PERIPHERAL     Culture Result A significant status has been triggered by the BACTEC  instrument due to an increase in CO2 production.  Gram  stain of blood culture bottle shows NO ORGANISMS SEEN.  Further investigation is in progress.  Note: Blood cultures are incubated for 5 days and  are monitored continuously. Positive blood cultures  are called to the RN and reported as soon as  they are identified.      Narrative:       Per Hospital Policy: Only change Specimen Src: to \"Line\" if  specified by physician order.  Left AC    BLOOD CULTURE x2 [[id number]] Collected:  03/03/20 1818    Order Status:  Completed Specimen:  Blood from Peripheral Updated:  03/04/20 1656     Significant Indicator NEG     Source BLD     Site PERIPHERAL     Culture Result A significant status has been triggered by the BACTEC  instrument due to an increase in CO2 production.  Gram  stain of blood culture bottle shows NO ORGANISMS SEEN.  Further investigation is in progress.  Note: Blood cultures are incubated for 5 days and  are monitored continuously. Positive blood cultures  are called to the RN and reported as soon as  they are identified.      Narrative:       Per Hospital Policy: Only change Specimen Src: to \"Line\" if  specified by physician order.  Right AC    GRAM STAIN [[id number]] Collected:  03/03/20 2055    Order Status:  Completed Specimen:  Wound Updated:  03/04/20 1546     Significant Indicator .     " Source WND     Site BUTTOCK     Gram Stain Result Moderate WBCs.  Moderate Gram positive cocci in chains      Narrative:       Buttock Body Fluid  Description: Right Buttock abscess  Surgery - swabs received        Continue wound care  Will be challenging disposition if he needs wound care, he is uninterested in attending wound care clinic  Likely will need to stay in-house until site better healed given high risk for recidivism and wound complications  Transition to p.o. antibiotics      Alcohol withdrawal (HCC)  Assessment & Plan  He says he drinks a 5th to a 1/2 gallon per day.  Very high risk for severe complications  Needs to be monitored closely    Normocytic anemia- (present on admission)  Assessment & Plan  Iron studies WNL  Sed rate 36  He is young for this. Possible reactive but not clear  Follow AM CBC    Heroin abuse (HCC)- (present on admission)  Assessment & Plan  IV and IM with abcess now in buttock.   He has a murmur.   ECHO wnl  HIV negative    Started methadone 10mg BID but still with poorly controlled pain  Increased to 20 mg every morning and 10 mg every afternoon       VTE prophylaxis: scd

## 2020-03-07 NOTE — PROGRESS NOTES
Bedside report received from night RN. Assumed care of patient. Daily plan of care discussed. Pt resting comfortably in bed at this time, no signs of distress noted. Breathing even and unlabored.  in place. Hourly rounding in place.

## 2020-03-07 NOTE — PROGRESS NOTES
Asleep in bed, easily arouse to stimuli. Pt denies any discomfort at this time. Call light w/. In easy reach. Bed alarm on. Will monitor at frequent interval for safety and needs.

## 2020-03-07 NOTE — PROGRESS NOTES
Telemetry Shift Summary    Rhythm SR  HR Range 60-80's  Ectopy no ectopy  Measurements 0.16/0.08/0.36        Normal Values  Rhythm SR  HR Range    Measurements 0.12-0.20 / 0.06-0.10  / 0.30-0.52

## 2020-03-07 NOTE — CARE PLAN
Problem: Nutritional:  Goal: Achieve adequate nutritional intake  Description: Patient will consume >50% of meals and snacks.   Outcome: MET     PO generally % + snack TID. RD to follow weekly.

## 2020-03-07 NOTE — CARE PLAN
Problem: Safety  Goal: Will remain free from injury  Outcome: PROGRESSING AS EXPECTED  Note: Patient will be free from injury or fall incident- instruction for use of call light given . Bed alarm on. Call light placed w/. In easy reach.     Problem: Bowel/Gastric:  Goal: Normal bowel function is maintained or improved  Outcome: PROGRESSING AS EXPECTED  Intervention: Educate patient and significant other/support system about diet, fluid intake, medications and activity to promote bowel function  Note: Encourage Patient to increase fluid intake and to adhere on schedule laxatives/ stool softeners to improve regularity in bowel elimination.      Problem: Knowledge Deficit  Goal: Knowledge of disease process/condition, treatment plan, diagnostic tests, and medications will improve  Outcome: PROGRESSING AS EXPECTED  Intervention: Explain information regarding disease process/condition, treatment plan, diagnostic tests, and medications and document in education  Note: Patient/ family member updated on Plan Of Care and Nurses communications with Doctors. POC- Meds,v/s monitoring, safety discussed / verbalized understanding.

## 2020-03-07 NOTE — PROGRESS NOTES
Telemetry Shift Summary     Rhythm SR-ST  HR Range 60s-121  Ectopy OPACs, RPVCs  Measurements 0.16/0.08/0.36           Normal Values  Rhythm SR  HR Range    Measurements 0.12-0.20 / 0.06-0.10  / 0.30-0.52

## 2020-03-08 PROCEDURE — 99232 SBSQ HOSP IP/OBS MODERATE 35: CPT | Performed by: HOSPITALIST

## 2020-03-08 PROCEDURE — 700102 HCHG RX REV CODE 250 W/ 637 OVERRIDE(OP): Performed by: INTERNAL MEDICINE

## 2020-03-08 PROCEDURE — A9270 NON-COVERED ITEM OR SERVICE: HCPCS | Performed by: INTERNAL MEDICINE

## 2020-03-08 PROCEDURE — 770020 HCHG ROOM/CARE - TELE (206)

## 2020-03-08 PROCEDURE — 700102 HCHG RX REV CODE 250 W/ 637 OVERRIDE(OP): Performed by: HOSPITALIST

## 2020-03-08 PROCEDURE — A9270 NON-COVERED ITEM OR SERVICE: HCPCS | Performed by: HOSPITALIST

## 2020-03-08 RX ADMIN — LORAZEPAM 2 MG: 1 TABLET ORAL at 21:28

## 2020-03-08 RX ADMIN — AMOXICILLIN AND CLAVULANATE POTASSIUM 1 TABLET: 875; 125 TABLET, FILM COATED ORAL at 17:32

## 2020-03-08 RX ADMIN — Medication 400 MG: at 17:32

## 2020-03-08 RX ADMIN — LORAZEPAM 0.5 MG: 0.5 TABLET ORAL at 05:46

## 2020-03-08 RX ADMIN — METHADONE HYDROCHLORIDE 20 MG: 10 TABLET ORAL at 05:46

## 2020-03-08 RX ADMIN — METHADONE HYDROCHLORIDE 10 MG: 10 TABLET ORAL at 17:32

## 2020-03-08 RX ADMIN — Medication 400 MG: at 05:46

## 2020-03-08 RX ADMIN — AMOXICILLIN AND CLAVULANATE POTASSIUM 1 TABLET: 875; 125 TABLET, FILM COATED ORAL at 05:46

## 2020-03-08 ASSESSMENT — LIFESTYLE VARIABLES
TREMOR: *
HEADACHE, FULLNESS IN HEAD: NOT PRESENT
ORIENTATION AND CLOUDING OF SENSORIUM: ORIENTED AND CAN DO SERIAL ADDITIONS
TOTAL SCORE: 3
HEADACHE, FULLNESS IN HEAD: MILD
VISUAL DISTURBANCES: NOT PRESENT
ANXIETY: MILDLY ANXIOUS
VISUAL DISTURBANCES: NOT PRESENT
TREMOR: NO TREMOR
VISUAL DISTURBANCES: NOT PRESENT
TOTAL SCORE: 3
TREMOR: NO TREMOR
TOTAL SCORE: 11
NAUSEA AND VOMITING: NO NAUSEA AND NO VOMITING
PAROXYSMAL SWEATS: BARELY PERCEPTIBLE SWEATING. PALMS MOIST
TOTAL SCORE: 7
AUDITORY DISTURBANCES: NOT PRESENT
NAUSEA AND VOMITING: NO NAUSEA AND NO VOMITING
ORIENTATION AND CLOUDING OF SENSORIUM: CANNOT DO SERIAL ADDITIONS OR IS UNCERTAIN ABOUT DATE
NAUSEA AND VOMITING: NO NAUSEA AND NO VOMITING
ANXIETY: MILDLY ANXIOUS
NAUSEA AND VOMITING: NO NAUSEA AND NO VOMITING
VISUAL DISTURBANCES: NOT PRESENT
ANXIETY: MILDLY ANXIOUS
HEADACHE, FULLNESS IN HEAD: NOT PRESENT
AUDITORY DISTURBANCES: NOT PRESENT
AGITATION: NORMAL ACTIVITY
NAUSEA AND VOMITING: NO NAUSEA AND NO VOMITING
ANXIETY: MILDLY ANXIOUS
TREMOR: TREMOR NOT VISIBLE BUT CAN BE FELT, FINGERTIP TO FINGERTIP
HEADACHE, FULLNESS IN HEAD: VERY MILD
TOTAL SCORE: 2
TOTAL SCORE: 2
AUDITORY DISTURBANCES: NOT PRESENT
AGITATION: NORMAL ACTIVITY
ANXIETY: NO ANXIETY (AT EASE)
PAROXYSMAL SWEATS: NO SWEAT VISIBLE
HEADACHE, FULLNESS IN HEAD: NOT PRESENT
TREMOR: NO TREMOR
PAROXYSMAL SWEATS: NO SWEAT VISIBLE
ORIENTATION AND CLOUDING OF SENSORIUM: ORIENTED AND CAN DO SERIAL ADDITIONS
TREMOR: NO TREMOR
ANXIETY: NO ANXIETY (AT EASE)
AGITATION: SOMEWHAT MORE THAN NORMAL ACTIVITY
VISUAL DISTURBANCES: NOT PRESENT
ORIENTATION AND CLOUDING OF SENSORIUM: CANNOT DO SERIAL ADDITIONS OR IS UNCERTAIN ABOUT DATE
PAROXYSMAL SWEATS: BARELY PERCEPTIBLE SWEATING. PALMS MOIST
AGITATION: NORMAL ACTIVITY
HEADACHE, FULLNESS IN HEAD: NOT PRESENT
ORIENTATION AND CLOUDING OF SENSORIUM: ORIENTED AND CAN DO SERIAL ADDITIONS
HEADACHE, FULLNESS IN HEAD: MODERATELY SEVERE
ANXIETY: MODERATELY ANXIOUS OR GUARDED, SO ANXIETY IS INFERRED
AUDITORY DISTURBANCES: NOT PRESENT
AGITATION: NORMAL ACTIVITY
PAROXYSMAL SWEATS: BARELY PERCEPTIBLE SWEATING. PALMS MOIST
TREMOR: NO TREMOR
TOTAL SCORE: MILD ITCHING, PINS AND NEEDLES SENSATION, BURNING OR NUMBNESS
AUDITORY DISTURBANCES: NOT PRESENT
VISUAL DISTURBANCES: NOT PRESENT
AGITATION: NORMAL ACTIVITY
ORIENTATION AND CLOUDING OF SENSORIUM: CANNOT DO SERIAL ADDITIONS OR IS UNCERTAIN ABOUT DATE
AUDITORY DISTURBANCES: NOT PRESENT
PAROXYSMAL SWEATS: NO SWEAT VISIBLE
VISUAL DISTURBANCES: NOT PRESENT
NAUSEA AND VOMITING: NO NAUSEA AND NO VOMITING
AGITATION: NORMAL ACTIVITY
AUDITORY DISTURBANCES: NOT PRESENT
ORIENTATION AND CLOUDING OF SENSORIUM: CANNOT DO SERIAL ADDITIONS OR IS UNCERTAIN ABOUT DATE
TOTAL SCORE: VERY MILD ITCHING, PINS AND NEEDLES SENSATION, BURNING OR NUMBNESS
TOTAL SCORE: 2
NAUSEA AND VOMITING: NO NAUSEA AND NO VOMITING
PAROXYSMAL SWEATS: NO SWEAT VISIBLE

## 2020-03-08 ASSESSMENT — ENCOUNTER SYMPTOMS
VOMITING: 0
TREMORS: 0
HEARTBURN: 0
SENSORY CHANGE: 0
TINGLING: 0
PALPITATIONS: 0
NAUSEA: 0
FEVER: 0
CHILLS: 0

## 2020-03-08 NOTE — PROGRESS NOTES
Telemetry Shift Summary    Rhythm ST/ST  HR Range 's  Ectopy Rare PVC  Measurements 0.16/0.08/0.36        Normal Values  Rhythm SR  HR Range    Measurements 0.12-0.20 / 0.06-0.10  / 0.30-0.52

## 2020-03-08 NOTE — PROGRESS NOTES
Hospital Medicine Daily Progress Note    Date of Service  3/8/2020    Chief Complaint  24 y.o. male admitted 3/3/2020 with buttock pain.     Hospital Course    He was found to have a right buttock abscess. He has been using IV heroin and alcohol. His hospital course was complicated by alcohol withdrawal which he was also treated for. Surgery was consulted and he was taken to the OR for I&D. He was placed onto IV antibiotics. He was noted to have a murmur. An echocardiogram and blood cultures were obtained.       Interval Problem Update  Continues to sleep all day, this morning no different  Increased methadone a.m. dose, no complaints of pain, patient falls back asleep quickly  No CP or SOB  Transitioned to p.o. antibiotics  Will likely need to stay in-house until wound better healed or better outpatient wound care plan in place    Consultants/Specialty  surgery    Code Status  full    Disposition  Tbd  **Call [facility] before discharging as he is to go to senior care once medically stable, per Jeannette Duong phone number [phone]    Review of Systems  Review of Systems   Constitutional: Positive for malaise/fatigue. Negative for chills and fever.   Cardiovascular: Negative for chest pain and palpitations.   Gastrointestinal: Negative for heartburn, nausea and vomiting.   Skin: Negative for itching and rash.   Neurological: Negative for tingling, tremors and sensory change.   All other systems reviewed and are negative.     Physical Exam  Temp:  [36.5 °C (97.7 °F)-36.8 °C (98.3 °F)] 36.7 °C (98 °F)  Pulse:  [70-84] 70  Resp:  [16-18] 18  BP: ()/(46-52) 95/49  SpO2:  [91 %-97 %] 97 %    Physical Exam  Vitals signs and nursing note reviewed.   Constitutional:       General: He is not in acute distress.     Appearance: He is ill-appearing. He is not diaphoretic.   HENT:      Head: Normocephalic and atraumatic.      Nose: No congestion or rhinorrhea.      Mouth/Throat:      Mouth: Mucous membranes are moist.       Pharynx: Oropharynx is clear.   Eyes:      General: No scleral icterus.        Right eye: No discharge.         Left eye: No discharge.      Extraocular Movements: Extraocular movements intact.      Conjunctiva/sclera: Conjunctivae normal.   Neck:      Musculoskeletal: Normal range of motion. No neck rigidity.   Cardiovascular:      Rate and Rhythm: Normal rate.      Pulses: Normal pulses.   Pulmonary:      Effort: Pulmonary effort is normal. No respiratory distress.      Breath sounds: No wheezing.   Abdominal:      General: Abdomen is flat. There is no distension.      Palpations: Abdomen is soft.      Tenderness: There is no abdominal tenderness.   Musculoskeletal: Normal range of motion.         General: Tenderness present.   Skin:     General: Skin is warm.      Coloration: Skin is pale. Skin is not jaundiced.      Comments: Left buttock wound bandage clean dry intact   Neurological:      General: No focal deficit present.      Mental Status: He is alert.      Cranial Nerves: No cranial nerve deficit.      Motor: No weakness.   Psychiatric:      Comments: Lethargic  Flat affect       Fluids    Intake/Output Summary (Last 24 hours) at 3/8/2020 0936  Last data filed at 3/8/2020 0900  Gross per 24 hour   Intake 1200 ml   Output 500 ml   Net 700 ml       Laboratory  Recent Labs     03/06/20  0428 03/07/20  0424   WBC 7.6 11.1*   RBC 3.82* 4.66*   HEMOGLOBIN 11.6* 14.3   HEMATOCRIT 36.0* 44.2   MCV 94.2 94.8   MCH 30.4 30.7   MCHC 32.2* 32.4*   RDW 44.2 43.6   PLATELETCT 467* 568*   MPV 9.5 9.1                       Imaging  EC-ECHOCARDIOGRAM COMPLETE W/O CONT   Final Result      CT-PELVIS WITH   Final Result      1.  Medial RIGHT buttock subcutaneous abscess measuring 7.2 cm with extensive surrounding inflammatory changes.  Inflammation extends to the underlying muscle without definite intramuscular extension of abscess.   2.  Multiple small inflammatory foci within the LEFT medial gluteal subcutaneous soft  tissues.   3.  Increased colonic stool suggesting constipation.           Assessment/Plan  * Abscess of right buttock- (present on admission)  Assessment & Plan  7.2cm on CT scan  S/p I&D with Dr. Hinton      Results     Procedure Component Value Units Date/Time    Anaerobic Culture [[id number]]  (Abnormal) Collected:  03/03/20 2055    Order Status:  Completed Specimen:  Wound Updated:  03/07/20 1445     Significant Indicator POS     Source WND     Site BUTTOCK     Culture Result Growth noted after further incubation, see below for  organism identification.        Streptococcus intermedius  Heavy growth      Narrative:       Buttock Body Fluid  Description: Right Buttock abscess  Surgery - swabs received    CULTURE WOUND W/ GRAM STAIN [[id number]]  (Abnormal)  (Susceptibility) Collected:  03/03/20 2055    Order Status:  Completed Specimen:  Wound from Buttock Updated:  03/07/20 1445     Significant Indicator POS     Source WND     Site BUTTOCK     Culture Result -     Gram Stain Result Moderate WBCs.  Moderate Gram positive cocci in chains       Culture Result Staphylococcus aureus  Rare growth        Haemophilus parainfluenzae (Beta-lactamase negative)  Moderate growth        Viridans Streptococcus  Moderate growth      Narrative:       Buttock Body Fluid  Description: Right Buttock abscess  Surgery - swabs received    Susceptibility     Staphylococcus aureus (1)     Antibiotic Interpretation Microscan Method Status    Azithromycin Sensitive <=2 mcg/mL MACK Final    Clindamycin Sensitive <=0.5 mcg/mL MACK Final    Cefazolin Sensitive <=8 mcg/mL MACK Final    Ceftaroline Sensitive <=0.5 mcg/mL MACK Final    Daptomycin Sensitive <=1 mcg/mL MACK Final    Ampicillin/sulbactam Sensitive <=8/4 mcg/mL MACK Final    Erythromycin Sensitive <=0.25 mcg/mL MACK Final    Vancomycin Sensitive 1 mcg/mL MACK Final    Oxacillin Sensitive <=0.25 mcg/mL MACK Final    Pip/Tazobactam Sensitive <=4 mcg/mL MACK Final    Trimeth/Sulfa Sensitive  "<=0.5/9.5 mcg/mL MACK Final    Tetracycline Sensitive <=4 mcg/mL MACK Final                   Blood Culture [[id number]] Collected:  03/04/20 1836    Order Status:  Completed Specimen:  Blood Updated:  03/05/20 0551     Significant Indicator NEG     Source BLD     Site -     Culture Result No Growth  Note: Blood cultures are incubated for 5 days and  are monitored continuously.Positive blood cultures  are called to the RN and reported as soon as  they are identified.  Blood culture testing and Gram stain, if indicated, are  performed at [facility], [address] [place] [address].  Positive blood cultures are  sent to [facility], [address] [place] [address], for organism identification and  susceptibility testing.      Narrative:       Left Hand    BLOOD CULTURE [[id number]] Collected:  03/04/20 1157    Order Status:  Completed Specimen:  Blood from Peripheral Updated:  03/05/20 0551     Significant Indicator NEG     Source BLD     Site PERIPHERAL     Culture Result No Growth  Note: Blood cultures are incubated for 5 days and  are monitored continuously.Positive blood cultures  are called to the RN and reported as soon as  they are identified.  Blood culture testing and Gram stain, if indicated, are  performed at [facility], [address] [place] [address].  Positive blood cultures are  sent to [facility], [address] [place] [address], for organism identification and  susceptibility testing.      Narrative:       Per Hospital Policy: Only change Specimen Src: to \"Line\" if  specified by physician order.  Right Hand    BLOOD CULTURE [[id number]] Collected:  03/04/20 1830    Order Status:  Canceled Specimen:  Other from Peripheral     BLOOD CULTURE x2 [[id number]]  (Abnormal) Collected:  03/03/20 1818    Order Status:  Completed Specimen:  Blood from Peripheral Updated:  03/04/20 1707     Significant Indicator POS     " "Source BLD     Site PERIPHERAL     Culture Result A significant status has been triggered by the BACTEC  instrument due to an increase in CO2 production.  Gram  stain of blood culture bottle shows NO ORGANISMS SEEN.  Further investigation is in progress.  Note: Blood cultures are incubated for 5 days and  are monitored continuously. Positive blood cultures  are called to the RN and reported as soon as  they are identified.      Narrative:       Per Hospital Policy: Only change Specimen Src: to \"Line\" if  specified by physician order.  Left AC    BLOOD CULTURE x2 [[id number]] Collected:  03/03/20 1818    Order Status:  Completed Specimen:  Blood from Peripheral Updated:  03/04/20 1656     Significant Indicator NEG     Source BLD     Site PERIPHERAL     Culture Result A significant status has been triggered by the BACTEC  instrument due to an increase in CO2 production.  Gram  stain of blood culture bottle shows NO ORGANISMS SEEN.  Further investigation is in progress.  Note: Blood cultures are incubated for 5 days and  are monitored continuously. Positive blood cultures  are called to the RN and reported as soon as  they are identified.      Narrative:       Per Hospital Policy: Only change Specimen Src: to \"Line\" if  specified by physician order.  Right AC    GRAM STAIN [[id number]] Collected:  03/03/20 2055    Order Status:  Completed Specimen:  Wound Updated:  03/04/20 1546     Significant Indicator .     Source WND     Site BUTTOCK     Gram Stain Result Moderate WBCs.  Moderate Gram positive cocci in chains      Narrative:       Buttock Body Fluid  Description: Right Buttock abscess  Surgery - swabs received        Continue wound care  Will be challenging disposition if he needs wound care, he is uninterested in attending wound care clinic  Likely will need to stay in-house until site better healed given high risk for recidivism and wound complications  Transition to p.o. antibiotics      Alcohol withdrawal " (HCC)  Assessment & Plan  He says he drinks a 5th to a 1/2 gallon per day.  Very high risk for severe complications  Needs to be monitored closely    Normocytic anemia- (present on admission)  Assessment & Plan  Iron studies WNL  Sed rate 36  He is young for this. Possible reactive but not clear  Follow AM CBC    Heroin abuse (HCC)- (present on admission)  Assessment & Plan  IV and IM with abcess now in buttock.   He has a murmur.   ECHO wnl  HIV negative    Started methadone 10mg BID but still with poorly controlled pain  Increased to 20 mg every morning and 10 mg every afternoon       VTE prophylaxis: scd

## 2020-03-08 NOTE — PROGRESS NOTES
Pt.s sister at bedside. She was able to help convinced pt it was safer for him to stay in the hospital for necessary care. Sister left. Pt was asleep and hard to arouse shortly after her departure.

## 2020-03-08 NOTE — PROGRESS NOTES
Telemetry Shift Summary    Rhythm SR  HR Range 70s  Ectopy rare PVC  Measurements 0.16/0.08/0.38        Normal Values  Rhythm SR  HR Range    Measurements 0.12-0.20 / 0.06-0.10  / 0.30-0.52

## 2020-03-08 NOTE — CARE PLAN
Problem: Safety  Goal: Will remain free from injury  Outcome: PROGRESSING AS EXPECTED  Goal: Will remain free from falls  Outcome: PROGRESSING AS EXPECTED     Problem: Infection  Goal: Will remain free from infection  Outcome: PROGRESSING AS EXPECTED   On PO abx. Pt drowsy. Woke and ate breakfast.

## 2020-03-08 NOTE — PROGRESS NOTES
Pt's brother in law at bedside here to pick pt up. Pt under the impression that he was discharging tonight. Explained he would be leaving AMA if he left without discharge orders. Pt refuses assessment and dressing change at this time.

## 2020-03-08 NOTE — CARE PLAN
Problem: Safety  Goal: Will remain free from injury  Outcome: PROGRESSING AS EXPECTED  Goal: Will remain free from falls  Outcome: PROGRESSING AS EXPECTED     Problem: Safety  Goal: Will remain free from falls  Outcome: PROGRESSING AS EXPECTED     Problem: Infection  Goal: Will remain free from infection  Outcome: PROGRESSING AS EXPECTED     Problem: Knowledge Deficit  Goal: Knowledge of the prescribed therapeutic regimen will improve  Outcome: PROGRESSING SLOWER THAN EXPECTED

## 2020-03-08 NOTE — PROGRESS NOTES
Pt resting in bed. Awoken and ate breakfast. Discussed the plan for wound care. Pt agrees. He is calm and cooperative at this time. Now back to rest.

## 2020-03-09 LAB
BACTERIA BLD CULT: NORMAL
SIGNIFICANT IND 70042: NORMAL
SITE SITE: NORMAL
SOURCE SOURCE: NORMAL

## 2020-03-09 PROCEDURE — 700102 HCHG RX REV CODE 250 W/ 637 OVERRIDE(OP): Performed by: INTERNAL MEDICINE

## 2020-03-09 PROCEDURE — A9270 NON-COVERED ITEM OR SERVICE: HCPCS | Performed by: INTERNAL MEDICINE

## 2020-03-09 PROCEDURE — 700102 HCHG RX REV CODE 250 W/ 637 OVERRIDE(OP): Performed by: HOSPITALIST

## 2020-03-09 PROCEDURE — 99232 SBSQ HOSP IP/OBS MODERATE 35: CPT | Performed by: HOSPITALIST

## 2020-03-09 PROCEDURE — 770020 HCHG ROOM/CARE - TELE (206)

## 2020-03-09 PROCEDURE — A9270 NON-COVERED ITEM OR SERVICE: HCPCS | Performed by: HOSPITALIST

## 2020-03-09 RX ORDER — METHADONE HYDROCHLORIDE 10 MG/1
10 TABLET ORAL DAILY
Status: DISCONTINUED | OUTPATIENT
Start: 2020-03-10 | End: 2020-03-10 | Stop reason: HOSPADM

## 2020-03-09 RX ORDER — METHADONE HYDROCHLORIDE 10 MG/1
10 TABLET ORAL EVERY EVENING
Status: DISCONTINUED | OUTPATIENT
Start: 2020-03-09 | End: 2020-03-10 | Stop reason: HOSPADM

## 2020-03-09 RX ADMIN — METHADONE HYDROCHLORIDE 20 MG: 10 TABLET ORAL at 04:51

## 2020-03-09 RX ADMIN — METHADONE HYDROCHLORIDE 10 MG: 10 TABLET ORAL at 17:17

## 2020-03-09 RX ADMIN — LORAZEPAM 1 MG: 1 TABLET ORAL at 02:17

## 2020-03-09 RX ADMIN — AMOXICILLIN AND CLAVULANATE POTASSIUM 1 TABLET: 875; 125 TABLET, FILM COATED ORAL at 04:51

## 2020-03-09 RX ADMIN — AMOXICILLIN AND CLAVULANATE POTASSIUM 1 TABLET: 875; 125 TABLET, FILM COATED ORAL at 17:17

## 2020-03-09 RX ADMIN — Medication 400 MG: at 17:17

## 2020-03-09 RX ADMIN — Medication 400 MG: at 04:51

## 2020-03-09 ASSESSMENT — LIFESTYLE VARIABLES
VISUAL DISTURBANCES: NOT PRESENT
TOTAL SCORE: 2
TOTAL SCORE: 2
NAUSEA AND VOMITING: NO NAUSEA AND NO VOMITING
NAUSEA AND VOMITING: NO NAUSEA AND NO VOMITING
TOTAL SCORE: 3
ANXIETY: MILDLY ANXIOUS
TREMOR: TREMOR NOT VISIBLE BUT CAN BE FELT, FINGERTIP TO FINGERTIP
AUDITORY DISTURBANCES: NOT PRESENT
ORIENTATION AND CLOUDING OF SENSORIUM: ORIENTED AND CAN DO SERIAL ADDITIONS
ANXIETY: NO ANXIETY (AT EASE)
ORIENTATION AND CLOUDING OF SENSORIUM: CANNOT DO SERIAL ADDITIONS OR IS UNCERTAIN ABOUT DATE
VISUAL DISTURBANCES: NOT PRESENT
PAROXYSMAL SWEATS: NO SWEAT VISIBLE
TREMOR: TREMOR NOT VISIBLE BUT CAN BE FELT, FINGERTIP TO FINGERTIP
HEADACHE, FULLNESS IN HEAD: NOT PRESENT
TREMOR: NO TREMOR
ANXIETY: MILDLY ANXIOUS
HEADACHE, FULLNESS IN HEAD: MODERATE
PAROXYSMAL SWEATS: NO SWEAT VISIBLE
NAUSEA AND VOMITING: NO NAUSEA AND NO VOMITING
ORIENTATION AND CLOUDING OF SENSORIUM: CANNOT DO SERIAL ADDITIONS OR IS UNCERTAIN ABOUT DATE
TREMOR: TREMOR NOT VISIBLE BUT CAN BE FELT, FINGERTIP TO FINGERTIP
VISUAL DISTURBANCES: NOT PRESENT
AUDITORY DISTURBANCES: NOT PRESENT
ANXIETY: *
PAROXYSMAL SWEATS: NO SWEAT VISIBLE
VISUAL DISTURBANCES: NOT PRESENT
AUDITORY DISTURBANCES: NOT PRESENT
NAUSEA AND VOMITING: MILD NAUSEA WITH NO VOMITING
TOTAL SCORE: 3
VISUAL DISTURBANCES: NOT PRESENT
AUDITORY DISTURBANCES: NOT PRESENT
AGITATION: NORMAL ACTIVITY
AGITATION: NORMAL ACTIVITY
TOTAL SCORE: 9
AUDITORY DISTURBANCES: NOT PRESENT
ORIENTATION AND CLOUDING OF SENSORIUM: CANNOT DO SERIAL ADDITIONS OR IS UNCERTAIN ABOUT DATE
TREMOR: TREMOR NOT VISIBLE BUT CAN BE FELT, FINGERTIP TO FINGERTIP
NAUSEA AND VOMITING: NO NAUSEA AND NO VOMITING
PAROXYSMAL SWEATS: NO SWEAT VISIBLE
ANXIETY: MILDLY ANXIOUS
AGITATION: NORMAL ACTIVITY
HEADACHE, FULLNESS IN HEAD: NOT PRESENT
PAROXYSMAL SWEATS: BARELY PERCEPTIBLE SWEATING. PALMS MOIST
HEADACHE, FULLNESS IN HEAD: NOT PRESENT
TREMOR: TREMOR NOT VISIBLE BUT CAN BE FELT, FINGERTIP TO FINGERTIP
TOTAL SCORE: 2
NAUSEA AND VOMITING: NO NAUSEA AND NO VOMITING
HEADACHE, FULLNESS IN HEAD: NOT PRESENT
AUDITORY DISTURBANCES: VERY MILD HARSHNESS OR ABILITY TO FRIGHTEN
ORIENTATION AND CLOUDING OF SENSORIUM: ORIENTED AND CAN DO SERIAL ADDITIONS
AGITATION: NORMAL ACTIVITY
AGITATION: NORMAL ACTIVITY
VISUAL DISTURBANCES: NOT PRESENT
AGITATION: NORMAL ACTIVITY
HEADACHE, FULLNESS IN HEAD: VERY MILD
ORIENTATION AND CLOUDING OF SENSORIUM: ORIENTED AND CAN DO SERIAL ADDITIONS
PAROXYSMAL SWEATS: NO SWEAT VISIBLE
ANXIETY: NO ANXIETY (AT EASE)

## 2020-03-09 ASSESSMENT — ENCOUNTER SYMPTOMS
TINGLING: 0
FEVER: 0
HEARTBURN: 0
TREMORS: 0
CHILLS: 0
VOMITING: 0
NAUSEA: 0
SENSORY CHANGE: 0
PALPITATIONS: 0

## 2020-03-09 NOTE — CARE PLAN
Problem: Safety  Goal: Will remain free from falls  Outcome: PROGRESSING AS EXPECTED     Problem: Bowel/Gastric:  Goal: Normal bowel function is maintained or improved  Outcome: PROGRESSING AS EXPECTED     Problem: Knowledge Deficit  Goal: Knowledge of disease process/condition, treatment plan, diagnostic tests, and medications will improve  Outcome: PROGRESSING AS EXPECTED     Problem: Pain Management  Goal: Pain level will decrease to patient's comfort goal  Outcome: PROGRESSING AS EXPECTED

## 2020-03-09 NOTE — DISCHARGE PLANNING
Anticipated Discharge Disposition: TBD    Action: LSW left a message for Officer Ad due to the unclear circumstances that the pt's discharge currently stands. It is unclear if pt will be taken into custody upon immediate discharge from hospital or not. LSW left a message for the health director at [facility] to inquire about wound care for the pt once discharged.    Barriers to Discharge: TBD    Plan: LSW will continue to assess pt for discharge needs.           UPDATE:  WAGNERW spoke with Officer Duong and was informed that once pt is ready for discharge, a courtesy call to let deputies know would be appreciated due to probable cause for an arrest. Officer Ad stated that if deputies do not make it or pt discharges before deputies are on scene that it is not an issue to let the pt leave.

## 2020-03-09 NOTE — CARE PLAN
Problem: Safety  Goal: Will remain free from falls  Outcome: PROGRESSING AS EXPECTED     Problem: Infection  Goal: Will remain free from infection  Outcome: PROGRESSING AS EXPECTED     Problem: Bowel/Gastric:  Goal: Will not experience complications related to bowel motility  Outcome: PROGRESSING SLOWER THAN EXPECTED

## 2020-03-09 NOTE — PROGRESS NOTES
Telemetry Shift Summary    Rhythm SR  HR Range 68-86  Ectopy rPVC  Measurements 0.18/0.08/0.38        Normal Values  Rhythm SR  HR Range    Measurements 0.12-0.20 / 0.06-0.10  / 0.30-0.52

## 2020-03-10 VITALS
SYSTOLIC BLOOD PRESSURE: 119 MMHG | OXYGEN SATURATION: 93 % | DIASTOLIC BLOOD PRESSURE: 66 MMHG | HEART RATE: 79 BPM | BODY MASS INDEX: 16.33 KG/M2 | RESPIRATION RATE: 16 BRPM | TEMPERATURE: 97.9 F | WEIGHT: 110.23 LBS | HEIGHT: 69 IN

## 2020-03-10 PROCEDURE — A9270 NON-COVERED ITEM OR SERVICE: HCPCS | Performed by: HOSPITALIST

## 2020-03-10 PROCEDURE — 700102 HCHG RX REV CODE 250 W/ 637 OVERRIDE(OP): Performed by: HOSPITALIST

## 2020-03-10 PROCEDURE — A9270 NON-COVERED ITEM OR SERVICE: HCPCS | Performed by: INTERNAL MEDICINE

## 2020-03-10 PROCEDURE — 700102 HCHG RX REV CODE 250 W/ 637 OVERRIDE(OP): Performed by: INTERNAL MEDICINE

## 2020-03-10 PROCEDURE — 99239 HOSP IP/OBS DSCHRG MGMT >30: CPT | Performed by: INTERNAL MEDICINE

## 2020-03-10 RX ORDER — ACETAMINOPHEN 325 MG/1
650 TABLET ORAL EVERY 6 HOURS PRN
Qty: 30 TAB | Refills: 0 | Status: SHIPPED | OUTPATIENT
Start: 2020-03-10

## 2020-03-10 RX ORDER — AMOXICILLIN AND CLAVULANATE POTASSIUM 875; 125 MG/1; MG/1
1 TABLET, FILM COATED ORAL EVERY 12 HOURS
Qty: 4 TAB | Refills: 0 | Status: SHIPPED | OUTPATIENT
Start: 2020-03-10 | End: 2020-03-12

## 2020-03-10 RX ADMIN — AMOXICILLIN AND CLAVULANATE POTASSIUM 1 TABLET: 875; 125 TABLET, FILM COATED ORAL at 05:55

## 2020-03-10 RX ADMIN — METHADONE HYDROCHLORIDE 10 MG: 10 TABLET ORAL at 05:55

## 2020-03-10 RX ADMIN — Medication 400 MG: at 05:56

## 2020-03-10 ASSESSMENT — LIFESTYLE VARIABLES
AUDITORY DISTURBANCES: NOT PRESENT
ANXIETY: NO ANXIETY (AT EASE)
AGITATION: NORMAL ACTIVITY
ANXIETY: NO ANXIETY (AT EASE)
PAROXYSMAL SWEATS: BARELY PERCEPTIBLE SWEATING. PALMS MOIST
NAUSEA AND VOMITING: NO NAUSEA AND NO VOMITING
VISUAL DISTURBANCES: NOT PRESENT
HEADACHE, FULLNESS IN HEAD: VERY MILD
ORIENTATION AND CLOUDING OF SENSORIUM: CANNOT DO SERIAL ADDITIONS OR IS UNCERTAIN ABOUT DATE
TREMOR: NO TREMOR
HEADACHE, FULLNESS IN HEAD: NOT PRESENT
ORIENTATION AND CLOUDING OF SENSORIUM: ORIENTED AND CAN DO SERIAL ADDITIONS
NAUSEA AND VOMITING: NO NAUSEA AND NO VOMITING
AUDITORY DISTURBANCES: NOT PRESENT
PAROXYSMAL SWEATS: BARELY PERCEPTIBLE SWEATING. PALMS MOIST
TOTAL SCORE: 3
TREMOR: TREMOR NOT VISIBLE BUT CAN BE FELT, FINGERTIP TO FINGERTIP
AUDITORY DISTURBANCES: NOT PRESENT
NAUSEA AND VOMITING: NO NAUSEA AND NO VOMITING
VISUAL DISTURBANCES: NOT PRESENT
AGITATION: NORMAL ACTIVITY
ANXIETY: NO ANXIETY (AT EASE)
TOTAL SCORE: 3
VISUAL DISTURBANCES: NOT PRESENT
AGITATION: NORMAL ACTIVITY
PAROXYSMAL SWEATS: BARELY PERCEPTIBLE SWEATING. PALMS MOIST
HEADACHE, FULLNESS IN HEAD: VERY MILD
TREMOR: NO TREMOR
ORIENTATION AND CLOUDING OF SENSORIUM: CANNOT DO SERIAL ADDITIONS OR IS UNCERTAIN ABOUT DATE
TOTAL SCORE: 2

## 2020-03-10 NOTE — PROGRESS NOTES
[facility] Medicine Daily Progress Note    Date of Service  3/9/2020    Chief Complaint  24 y.o. male admitted 3/3/2020 with buttock pain.     Hospital Course    He was found to have a right buttock abscess. He has been using IV heroin and alcohol. His hospital course was complicated by alcohol withdrawal which he was also treated for. Surgery was consulted and he was taken to the OR for I&D. He was placed onto IV antibiotics. He was noted to have a murmur. An echocardiogram and blood cultures were obtained.       Interval Problem Update  Continues to sleep all day, this morning no different - falls back asleep quickly, seems over sedated --> drop Methadone to 10mg BID  No CP or SOB  Transitioned to p.o. antibiotics  No fevers overnight    Consultants/Specialty  surgery    Code Status  full    Disposition  CM working on contacting PD about patient  Ready for discharge soon, can finish out ABX course but will need some wound care which is likely not going to follow up with at home    Review of Systems  Review of Systems   Constitutional: Positive for malaise/fatigue. Negative for chills and fever.   Cardiovascular: Negative for chest pain and palpitations.   Gastrointestinal: Negative for heartburn, nausea and vomiting.   Skin: Negative for itching and rash.   Neurological: Negative for tingling, tremors and sensory change.   All other systems reviewed and are negative.     Physical Exam  Temp:  [36.3 °C (97.4 °F)-36.9 °C (98.5 °F)] 36.4 °C (97.5 °F)  Pulse:  [] 79  Resp:  [14-16] 16  BP: ()/(43-65) 105/52  SpO2:  [90 %-98 %] 98 %    Physical Exam  Vitals signs and nursing note reviewed.   Constitutional:       General: He is not in acute distress.     Appearance: He is ill-appearing. He is not diaphoretic.   HENT:      Head: Normocephalic and atraumatic.      Nose: No congestion or rhinorrhea.      Mouth/Throat:      Mouth: Mucous membranes are moist.      Pharynx: Oropharynx is clear.   Eyes:       General: No scleral icterus.        Right eye: No discharge.         Left eye: No discharge.      Extraocular Movements: Extraocular movements intact.      Conjunctiva/sclera: Conjunctivae normal.   Neck:      Musculoskeletal: Normal range of motion. No neck rigidity.   Cardiovascular:      Rate and Rhythm: Normal rate.      Pulses: Normal pulses.   Pulmonary:      Effort: Pulmonary effort is normal. No respiratory distress.      Breath sounds: No wheezing.   Abdominal:      General: Abdomen is flat. There is no distension.      Palpations: Abdomen is soft.      Tenderness: There is no abdominal tenderness.   Musculoskeletal: Normal range of motion.         General: Tenderness present.   Skin:     General: Skin is warm.      Coloration: Skin is pale. Skin is not jaundiced.      Comments: Left buttock wound bandage clean dry intact   Neurological:      General: No focal deficit present.      Mental Status: He is alert.      Cranial Nerves: No cranial nerve deficit.      Motor: No weakness.   Psychiatric:      Comments: Lethargic  Flat affect       Fluids    Intake/Output Summary (Last 24 hours) at 3/9/2020 1711  Last data filed at 3/9/2020 0400  Gross per 24 hour   Intake 1100 ml   Output 800 ml   Net 300 ml       Laboratory  Recent Labs     03/07/20  0424   WBC 11.1*   RBC 4.66*   HEMOGLOBIN 14.3   HEMATOCRIT 44.2   MCV 94.8   MCH 30.7   MCHC 32.4*   RDW 43.6   PLATELETCT 568*   MPV 9.1                       Imaging  EC-ECHOCARDIOGRAM COMPLETE W/O CONT   Final Result      CT-PELVIS WITH   Final Result      1.  Medial RIGHT buttock subcutaneous abscess measuring 7.2 cm with extensive surrounding inflammatory changes.  Inflammation extends to the underlying muscle without definite intramuscular extension of abscess.   2.  Multiple small inflammatory foci within the LEFT medial gluteal subcutaneous soft tissues.   3.  Increased colonic stool suggesting constipation.           Assessment/Plan  * Abscess of right  "buttock- (present on admission)  Assessment & Plan  7.2cm on CT scan  S/p I&D with Dr. Hinton      Results     Procedure Component Value Units Date/Time    BLOOD CULTURE [[id number]] Collected:  03/04/20 1157    Order Status:  Completed Specimen:  Blood from Peripheral Updated:  03/09/20 1417     Significant Indicator NEG     Source BLD     Site PERIPHERAL     Culture Result No growth after 5 days of incubation.  Blood culture testing and Gram stain, if indicated, are  performed at [facility], [address] [place] [address].  Positive blood cultures are  sent to [facility], [address] [place] [address], for organism identification and  susceptibility testing.      Narrative:       Per Hospital Policy: Only change Specimen Src: to \"Line\" if  specified by physician order.  Right Hand    BLOOD CULTURE x2 [[id number]] Collected:  03/03/20 1818    Order Status:  Completed Specimen:  Blood from Peripheral Updated:  03/09/20 0856     Significant Indicator NEG     Source BLD     Site PERIPHERAL     Culture Result No growth after 5 days of incubation.    Narrative:       Per Hospital Policy: Only change Specimen Src: to \"Line\" if  specified by physician order.  Left AC    BLOOD CULTURE x2 [[id number]] Collected:  03/03/20 1818    Order Status:  Completed Specimen:  Blood from Peripheral Updated:  03/09/20 0855     Significant Indicator NEG     Source BLD     Site PERIPHERAL     Culture Result No growth after 5 days of incubation.    Narrative:       Per Hospital Policy: Only change Specimen Src: to \"Line\" if  specified by physician order.  Right AC    Anaerobic Culture [[id number]]  (Abnormal) Collected:  03/03/20 2055    Order Status:  Completed Specimen:  Wound Updated:  03/07/20 1445     Significant Indicator POS     Source WND     Site BUTTOCK     Culture Result Growth noted after further incubation, see below for  organism identification.        Streptococcus " intermedius  Heavy growth      Narrative:       Buttock Body Fluid  Description: Right Buttock abscess  Surgery - swabs received    CULTURE WOUND W/ GRAM STAIN [[id number]]  (Abnormal)  (Susceptibility) Collected:  03/03/20 2055    Order Status:  Completed Specimen:  Wound from Buttock Updated:  03/07/20 1445     Significant Indicator POS     Source WND     Site BUTTOCK     Culture Result -     Gram Stain Result Moderate WBCs.  Moderate Gram positive cocci in chains       Culture Result Staphylococcus aureus  Rare growth        Haemophilus parainfluenzae (Beta-lactamase negative)  Moderate growth        Viridans Streptococcus  Moderate growth      Narrative:       Buttock Body Fluid  Description: Right Buttock abscess  Surgery - swabs received    Susceptibility     Staphylococcus aureus (1)     Antibiotic Interpretation Microscan Method Status    Azithromycin Sensitive <=2 mcg/mL MACK Final    Clindamycin Sensitive <=0.5 mcg/mL MACK Final    Cefazolin Sensitive <=8 mcg/mL MACK Final    Ceftaroline Sensitive <=0.5 mcg/mL MACK Final    Daptomycin Sensitive <=1 mcg/mL MACK Final    Ampicillin/sulbactam Sensitive <=8/4 mcg/mL MACK Final    Erythromycin Sensitive <=0.25 mcg/mL MACK Final    Vancomycin Sensitive 1 mcg/mL MACK Final    Oxacillin Sensitive <=0.25 mcg/mL MACK Final    Pip/Tazobactam Sensitive <=4 mcg/mL MACK Final    Trimeth/Sulfa Sensitive <=0.5/9.5 mcg/mL MACK Final    Tetracycline Sensitive <=4 mcg/mL MACK Final                   Blood Culture [[id number]] Collected:  03/04/20 1836    Order Status:  Completed Specimen:  Blood Updated:  03/05/20 0551     Significant Indicator NEG     Source BLD     Site -     Culture Result No Growth  Note: Blood cultures are incubated for 5 days and  are monitored continuously.Positive blood cultures  are called to the RN and reported as soon as  they are identified.  Blood culture testing and Gram stain, if indicated, are  performed at [facility],  [address]  [place], [place].  Positive blood cultures are  sent to [facility], [address] [place] [address], for organism identification and  susceptibility testing.      Narrative:       Left Hand    BLOOD CULTURE [[id number]] Collected:  03/04/20 1830    Order Status:  Canceled Specimen:  Other from Peripheral     GRAM STAIN [[id number]] Collected:  03/03/20 2055    Order Status:  Completed Specimen:  Wound Updated:  03/04/20 1546     Significant Indicator .     Source WND     Site BUTTOCK     Gram Stain Result Moderate WBCs.  Moderate Gram positive cocci in chains      Narrative:       Buttock Body Fluid  Description: Right Buttock abscess  Surgery - swabs received        Continue wound care  Will be challenging disposition if he needs wound care, he is uninterested in attending wound care clinic      Alcohol withdrawal (HCC)  Assessment & Plan  He says he drinks a 5th to a 1/2 gallon per day.  Very high risk for severe complications but has been doing well with CIWA protocol, scoring low now    Normocytic anemia- (present on admission)  Assessment & Plan  Iron studies WNL  Sed rate 36    Heroin abuse (HCC)- (present on admission)  Assessment & Plan  IV and IM with abcess now in buttock.   He has a murmur.   ECHO wnl  HIV negative       VTE prophylaxis: scd

## 2020-03-10 NOTE — PROGRESS NOTES
"Pt awakens to voice. No signs of distress noted. Ate breakfast. Asking to leave today. Reminded him he needs further wound care. Pt states \"ok.\" no other needs currently. Seems more alert this AM.   "

## 2020-03-10 NOTE — DISCHARGE PLANNING
Anticipated Discharge Disposition: [facility]    Action: LSW spoke with the DON at [facility] and was informed they provide wound care and will be able to accomodate pt's needs.     Barriers to Discharge: None    Plan: LSW will continue to assess pt for discharge needs.

## 2020-03-10 NOTE — PROGRESS NOTES
Report received from MARKY Cheema RN. Pt drowsy but awakens to sound. Pt calm, pleasant. Denies needs at this time. Dressing CDI. VSS. Will continue to monitor.

## 2020-03-10 NOTE — DISCHARGE SUMMARY
Discharge Summary    CHIEF COMPLAINT ON ADMISSION  Chief Complaint   Patient presents with   • Abscess     on R buttock x 1 week       Reason for Admission  EMS     Admission Date  3/3/2020    CODE STATUS  Full Code    HPI & HOSPITAL COURSE  This is a 24 y.o. male with a history of polysubstance abuse including IV and IM heroin here with right buttock abscess.  He was admitted for an I&D which was performed on 3/3/20 without complication.  Cultures grew MSSA.  The patient was placed on IV medications initially and had improvement therefore was transitioned to oral Augmentin.  He requires this medication through 3/11/2020.  He has been requiring twice daily dressing changes and packing of the right buttock area.  This has been done by wound care.  On discharge, the patient has been discharged to law enforcement.  He will need ongoing wound care twice daily.  During his hospital stay, he was treated with methadone twice a day to prevent heroin withdrawal however this will not be prescribed on discharge.  It is anticipated that he will have withdrawal symptoms.       Therefore, he is discharged in guarded and stable condition incarceration facility    The patient met 2-midnight criteria for an inpatient stay at the time of discharge.    Discharge Date  3/10/2020    FOLLOW UP ITEMS POST DISCHARGE  Follow-up with PCP within 1 week  Continue twice daily wound care at senior living    DISCHARGE DIAGNOSES  Principal Problem:    Abscess of right buttock POA: Yes  Active Problems:    Heroin abuse (HCC) POA: Yes    Normocytic anemia POA: Yes    Alcohol withdrawal (HCC) POA: Unknown  Resolved Problems:    * No resolved hospital problems. *      FOLLOW UP  No future appointments.  [address] [address] [phone]  Schedule an appointment as soon as possible for a visit  Please call to establish with a Primary Care Physicain and schedule your hospital follow up. Thank  you.      MEDICATIONS ON DISCHARGE     Medication List      START taking these medications      Instructions   acetaminophen 325 MG Tabs  Commonly known as:  TYLENOL   Take 2 Tabs by mouth every 6 hours as needed (Mild Pain; (Pain scale 1-3); Temp greater than 100.5 F).  Dose:  650 mg     amoxicillin-clavulanate 875-125 MG Tabs  Commonly known as:  AUGMENTIN   Take 1 Tab by mouth every 12 hours for 2 days.  Dose:  1 Tab        CONTINUE taking these medications      Instructions   multivitamin Tabs   Take 1 Tab by mouth every day.  Dose:  1 Tab        STOP taking these medications    Aleve 220 MG tablet  Generic drug:  naproxen     aspirin 325 MG Tabs  Commonly known as:  ASA            Allergies  No Known Allergies    DIET  Orders Placed This Encounter   Procedures   • Diet Order Regular     Standing Status:   Standing     Number of Occurrences:   1     Order Specific Question:   Diet:     Answer:   Regular [1]       ACTIVITY  As tolerated.  Weight bearing as tolerated    CONSULTATIONS  Dr. Hinton- surgery    PROCEDURES  320, I&D of right buttock abscess    LABORATORY  Lab Results   Component Value Date    SODIUM 143 03/05/2020    POTASSIUM 4.0 03/05/2020    CHLORIDE 109 03/05/2020    CO2 24 03/05/2020    GLUCOSE 94 03/05/2020    BUN 10 03/05/2020    CREATININE 0.71 03/05/2020        Lab Results   Component Value Date    WBC 11.1 (H) 03/07/2020    HEMOGLOBIN 14.3 03/07/2020    HEMATOCRIT 44.2 03/07/2020    PLATELETCT 568 (H) 03/07/2020        Total time of the discharge process exceeds 40 minutes.

## 2020-03-10 NOTE — PROGRESS NOTES
Pt has been discharge. Blanco WHITE here. Pt leaving now cherrie WHITE.  Wound care completed and IV out.

## 2020-07-14 ENCOUNTER — APPOINTMENT (OUTPATIENT)
Dept: RADIOLOGY | Facility: MEDICAL CENTER | Age: 25
DRG: 872 | End: 2020-07-14
Attending: EMERGENCY MEDICINE
Payer: MEDICARE

## 2020-07-14 ENCOUNTER — HOSPITAL ENCOUNTER (INPATIENT)
Facility: MEDICAL CENTER | Age: 25
LOS: 4 days | DRG: 872 | End: 2020-07-19
Attending: EMERGENCY MEDICINE | Admitting: INTERNAL MEDICINE
Payer: MEDICARE

## 2020-07-14 DIAGNOSIS — M79.602 PAIN OF LEFT UPPER EXTREMITY: ICD-10-CM

## 2020-07-14 DIAGNOSIS — L03.114 CELLULITIS OF LEFT UPPER EXTREMITY: ICD-10-CM

## 2020-07-14 DIAGNOSIS — F19.90 IV DRUG USER: ICD-10-CM

## 2020-07-14 DIAGNOSIS — R50.9 FEVER, UNSPECIFIED FEVER CAUSE: ICD-10-CM

## 2020-07-14 PROCEDURE — 80053 COMPREHEN METABOLIC PANEL: CPT

## 2020-07-14 PROCEDURE — 99285 EMERGENCY DEPT VISIT HI MDM: CPT

## 2020-07-14 PROCEDURE — 85652 RBC SED RATE AUTOMATED: CPT

## 2020-07-14 PROCEDURE — 96365 THER/PROPH/DIAG IV INF INIT: CPT

## 2020-07-14 PROCEDURE — 36415 COLL VENOUS BLD VENIPUNCTURE: CPT

## 2020-07-14 PROCEDURE — 96375 TX/PRO/DX INJ NEW DRUG ADDON: CPT

## 2020-07-14 PROCEDURE — 85025 COMPLETE CBC W/AUTO DIFF WBC: CPT

## 2020-07-14 PROCEDURE — 83605 ASSAY OF LACTIC ACID: CPT

## 2020-07-14 PROCEDURE — 86140 C-REACTIVE PROTEIN: CPT

## 2020-07-14 PROCEDURE — 84145 PROCALCITONIN (PCT): CPT

## 2020-07-14 PROCEDURE — 87040 BLOOD CULTURE FOR BACTERIA: CPT

## 2020-07-14 ASSESSMENT — FIBROSIS 4 INDEX: FIB4 SCORE: 0.22

## 2020-07-15 ENCOUNTER — APPOINTMENT (OUTPATIENT)
Dept: RADIOLOGY | Facility: MEDICAL CENTER | Age: 25
DRG: 872 | End: 2020-07-15
Attending: INTERNAL MEDICINE
Payer: MEDICARE

## 2020-07-15 ENCOUNTER — APPOINTMENT (OUTPATIENT)
Dept: RADIOLOGY | Facility: MEDICAL CENTER | Age: 25
DRG: 872 | End: 2020-07-15
Attending: STUDENT IN AN ORGANIZED HEALTH CARE EDUCATION/TRAINING PROGRAM
Payer: MEDICARE

## 2020-07-15 PROBLEM — A41.9 SEPSIS (HCC): Status: ACTIVE | Noted: 2020-07-15

## 2020-07-15 PROBLEM — L03.114 LEFT ARM CELLULITIS: Status: ACTIVE | Noted: 2020-07-15

## 2020-07-15 LAB
ALBUMIN SERPL BCP-MCNC: 4.3 G/DL (ref 3.2–4.9)
ALBUMIN/GLOB SERPL: 1.5 G/DL
ALP SERPL-CCNC: 85 U/L (ref 30–99)
ALT SERPL-CCNC: 94 U/L (ref 2–50)
AMPHET UR QL SCN: POSITIVE
ANION GAP SERPL CALC-SCNC: 15 MMOL/L (ref 7–16)
APPEARANCE UR: CLEAR
AST SERPL-CCNC: 89 U/L (ref 12–45)
BARBITURATES UR QL SCN: NEGATIVE
BASOPHILS # BLD AUTO: 0.3 % (ref 0–1.8)
BASOPHILS # BLD: 0.05 K/UL (ref 0–0.12)
BENZODIAZ UR QL SCN: NEGATIVE
BILIRUB SERPL-MCNC: 0.9 MG/DL (ref 0.1–1.5)
BILIRUB UR QL STRIP.AUTO: NEGATIVE
BUN SERPL-MCNC: 14 MG/DL (ref 8–22)
BZE UR QL SCN: NEGATIVE
CALCIUM SERPL-MCNC: 9.2 MG/DL (ref 8.5–10.5)
CANNABINOIDS UR QL SCN: NEGATIVE
CHLORIDE SERPL-SCNC: 94 MMOL/L (ref 96–112)
CK SERPL-CCNC: 79 U/L (ref 0–154)
CO2 SERPL-SCNC: 23 MMOL/L (ref 20–33)
COLOR UR: YELLOW
CREAT SERPL-MCNC: 1.07 MG/DL (ref 0.5–1.4)
CRP SERPL HS-MCNC: 21.33 MG/DL (ref 0–0.75)
EOSINOPHIL # BLD AUTO: 0.2 K/UL (ref 0–0.51)
EOSINOPHIL NFR BLD: 1.3 % (ref 0–6.9)
ERYTHROCYTE [DISTWIDTH] IN BLOOD BY AUTOMATED COUNT: 44.5 FL (ref 35.9–50)
ERYTHROCYTE [SEDIMENTATION RATE] IN BLOOD BY WESTERGREN METHOD: 35 MM/HOUR (ref 0–15)
ETHANOL BLD-MCNC: <10.1 MG/DL (ref 0–10.1)
GLOBULIN SER CALC-MCNC: 2.8 G/DL (ref 1.9–3.5)
GLUCOSE SERPL-MCNC: 137 MG/DL (ref 65–99)
GLUCOSE UR STRIP.AUTO-MCNC: NEGATIVE MG/DL
HAV IGM SERPL QL IA: NORMAL
HBV CORE IGM SER QL: NORMAL
HBV SURFACE AG SER QL: NORMAL
HCT VFR BLD AUTO: 39.8 % (ref 42–52)
HCV AB SER QL: NORMAL
HGB BLD-MCNC: 13.8 G/DL (ref 14–18)
HIV 1+2 AB+HIV1 P24 AG SERPL QL IA: NORMAL
IMM GRANULOCYTES # BLD AUTO: 0.08 K/UL (ref 0–0.11)
IMM GRANULOCYTES NFR BLD AUTO: 0.5 % (ref 0–0.9)
INR PPP: 1.28 (ref 0.87–1.13)
KETONES UR STRIP.AUTO-MCNC: NEGATIVE MG/DL
LACTATE BLD-SCNC: 1.3 MMOL/L (ref 0.5–2)
LACTATE BLD-SCNC: 1.4 MMOL/L (ref 0.5–2)
LACTATE BLD-SCNC: 1.7 MMOL/L (ref 0.5–2)
LACTATE BLD-SCNC: 2.1 MMOL/L (ref 0.5–2)
LACTATE BLD-SCNC: 2.8 MMOL/L (ref 0.5–2)
LEUKOCYTE ESTERASE UR QL STRIP.AUTO: NEGATIVE
LYMPHOCYTES # BLD AUTO: 0.41 K/UL (ref 1–4.8)
LYMPHOCYTES NFR BLD: 2.7 % (ref 22–41)
MCH RBC QN AUTO: 31.4 PG (ref 27–33)
MCHC RBC AUTO-ENTMCNC: 34.7 G/DL (ref 33.7–35.3)
MCV RBC AUTO: 90.5 FL (ref 81.4–97.8)
METHADONE UR QL SCN: NEGATIVE
MICRO URNS: NORMAL
MONOCYTES # BLD AUTO: 0.85 K/UL (ref 0–0.85)
MONOCYTES NFR BLD AUTO: 5.5 % (ref 0–13.4)
NEUTROPHILS # BLD AUTO: 13.78 K/UL (ref 1.82–7.42)
NEUTROPHILS NFR BLD: 89.7 % (ref 44–72)
NITRITE UR QL STRIP.AUTO: NEGATIVE
NRBC # BLD AUTO: 0 K/UL
NRBC BLD-RTO: 0 /100 WBC
OPIATES UR QL SCN: POSITIVE
OXYCODONE UR QL SCN: POSITIVE
PCP UR QL SCN: NEGATIVE
PH UR STRIP.AUTO: 6 [PH] (ref 5–8)
PLATELET # BLD AUTO: 234 K/UL (ref 164–446)
PMV BLD AUTO: 10.1 FL (ref 9–12.9)
POTASSIUM SERPL-SCNC: 4 MMOL/L (ref 3.6–5.5)
PROCALCITONIN SERPL-MCNC: 1.04 NG/ML
PROPOXYPH UR QL SCN: NEGATIVE
PROT SERPL-MCNC: 7.1 G/DL (ref 6–8.2)
PROT UR QL STRIP: NEGATIVE MG/DL
PROTHROMBIN TIME: 16.4 SEC (ref 12–14.6)
RBC # BLD AUTO: 4.4 M/UL (ref 4.7–6.1)
RBC UR QL AUTO: NEGATIVE
SODIUM SERPL-SCNC: 132 MMOL/L (ref 135–145)
SP GR UR STRIP.AUTO: 1.03
UROBILINOGEN UR STRIP.AUTO-MCNC: 1 MG/DL
WBC # BLD AUTO: 15.4 K/UL (ref 4.8–10.8)

## 2020-07-15 PROCEDURE — 700111 HCHG RX REV CODE 636 W/ 250 OVERRIDE (IP): Performed by: EMERGENCY MEDICINE

## 2020-07-15 PROCEDURE — 71045 X-RAY EXAM CHEST 1 VIEW: CPT

## 2020-07-15 PROCEDURE — 700111 HCHG RX REV CODE 636 W/ 250 OVERRIDE (IP): Performed by: INTERNAL MEDICINE

## 2020-07-15 PROCEDURE — 700102 HCHG RX REV CODE 250 W/ 637 OVERRIDE(OP): Performed by: STUDENT IN AN ORGANIZED HEALTH CARE EDUCATION/TRAINING PROGRAM

## 2020-07-15 PROCEDURE — 700105 HCHG RX REV CODE 258: Performed by: STUDENT IN AN ORGANIZED HEALTH CARE EDUCATION/TRAINING PROGRAM

## 2020-07-15 PROCEDURE — 700105 HCHG RX REV CODE 258: Performed by: INTERNAL MEDICINE

## 2020-07-15 PROCEDURE — 81003 URINALYSIS AUTO W/O SCOPE: CPT

## 2020-07-15 PROCEDURE — A9270 NON-COVERED ITEM OR SERVICE: HCPCS | Performed by: INTERNAL MEDICINE

## 2020-07-15 PROCEDURE — 700102 HCHG RX REV CODE 250 W/ 637 OVERRIDE(OP): Performed by: INTERNAL MEDICINE

## 2020-07-15 PROCEDURE — 87389 HIV-1 AG W/HIV-1&-2 AB AG IA: CPT

## 2020-07-15 PROCEDURE — 80074 ACUTE HEPATITIS PANEL: CPT

## 2020-07-15 PROCEDURE — 82550 ASSAY OF CK (CPK): CPT

## 2020-07-15 PROCEDURE — 80307 DRUG TEST PRSMV CHEM ANLYZR: CPT

## 2020-07-15 PROCEDURE — 73201 CT UPPER EXTREMITY W/DYE: CPT | Mod: LT

## 2020-07-15 PROCEDURE — 93971 EXTREMITY STUDY: CPT | Mod: 26 | Performed by: INTERNAL MEDICINE

## 2020-07-15 PROCEDURE — 99221 1ST HOSP IP/OBS SF/LOW 40: CPT | Mod: AI | Performed by: INTERNAL MEDICINE

## 2020-07-15 PROCEDURE — A9270 NON-COVERED ITEM OR SERVICE: HCPCS | Performed by: STUDENT IN AN ORGANIZED HEALTH CARE EDUCATION/TRAINING PROGRAM

## 2020-07-15 PROCEDURE — 36415 COLL VENOUS BLD VENIPUNCTURE: CPT

## 2020-07-15 PROCEDURE — 700111 HCHG RX REV CODE 636 W/ 250 OVERRIDE (IP): Performed by: STUDENT IN AN ORGANIZED HEALTH CARE EDUCATION/TRAINING PROGRAM

## 2020-07-15 PROCEDURE — 700101 HCHG RX REV CODE 250: Performed by: INTERNAL MEDICINE

## 2020-07-15 PROCEDURE — 93971 EXTREMITY STUDY: CPT | Mod: LT

## 2020-07-15 PROCEDURE — 700105 HCHG RX REV CODE 258

## 2020-07-15 PROCEDURE — 700105 HCHG RX REV CODE 258: Performed by: EMERGENCY MEDICINE

## 2020-07-15 PROCEDURE — C9803 HOPD COVID-19 SPEC COLLECT: HCPCS | Performed by: INTERNAL MEDICINE

## 2020-07-15 PROCEDURE — 83605 ASSAY OF LACTIC ACID: CPT

## 2020-07-15 PROCEDURE — 700117 HCHG RX CONTRAST REV CODE 255: Performed by: INTERNAL MEDICINE

## 2020-07-15 PROCEDURE — 85610 PROTHROMBIN TIME: CPT

## 2020-07-15 PROCEDURE — 770020 HCHG ROOM/CARE - TELE (206)

## 2020-07-15 RX ORDER — ACETAMINOPHEN 325 MG/1
650 TABLET ORAL EVERY 6 HOURS PRN
Status: DISCONTINUED | OUTPATIENT
Start: 2020-07-15 | End: 2020-07-19 | Stop reason: HOSPADM

## 2020-07-15 RX ORDER — ONDANSETRON 2 MG/ML
4 INJECTION INTRAMUSCULAR; INTRAVENOUS EVERY 4 HOURS PRN
Status: DISCONTINUED | OUTPATIENT
Start: 2020-07-15 | End: 2020-07-19 | Stop reason: HOSPADM

## 2020-07-15 RX ORDER — SODIUM CHLORIDE, SODIUM LACTATE, POTASSIUM CHLORIDE, AND CALCIUM CHLORIDE .6; .31; .03; .02 G/100ML; G/100ML; G/100ML; G/100ML
30 INJECTION, SOLUTION INTRAVENOUS
Status: COMPLETED | OUTPATIENT
Start: 2020-07-15 | End: 2020-07-18

## 2020-07-15 RX ORDER — OXYCODONE HYDROCHLORIDE 10 MG/1
10 TABLET ORAL
Status: DISCONTINUED | OUTPATIENT
Start: 2020-07-15 | End: 2020-07-17

## 2020-07-15 RX ORDER — PROMETHAZINE HYDROCHLORIDE 25 MG/1
12.5-25 SUPPOSITORY RECTAL EVERY 4 HOURS PRN
Status: DISCONTINUED | OUTPATIENT
Start: 2020-07-15 | End: 2020-07-19 | Stop reason: HOSPADM

## 2020-07-15 RX ORDER — POLYETHYLENE GLYCOL 3350 17 G/17G
1 POWDER, FOR SOLUTION ORAL
Status: DISCONTINUED | OUTPATIENT
Start: 2020-07-15 | End: 2020-07-19 | Stop reason: HOSPADM

## 2020-07-15 RX ORDER — SODIUM CHLORIDE, SODIUM LACTATE, POTASSIUM CHLORIDE, CALCIUM CHLORIDE 600; 310; 30; 20 MG/100ML; MG/100ML; MG/100ML; MG/100ML
1000 INJECTION, SOLUTION INTRAVENOUS ONCE
Status: COMPLETED | OUTPATIENT
Start: 2020-07-15 | End: 2020-07-15

## 2020-07-15 RX ORDER — SODIUM CHLORIDE 9 MG/ML
INJECTION, SOLUTION INTRAVENOUS CONTINUOUS
Status: DISCONTINUED | OUTPATIENT
Start: 2020-07-16 | End: 2020-07-19 | Stop reason: HOSPADM

## 2020-07-15 RX ORDER — MORPHINE SULFATE 4 MG/ML
4 INJECTION, SOLUTION INTRAMUSCULAR; INTRAVENOUS
Status: DISCONTINUED | OUTPATIENT
Start: 2020-07-15 | End: 2020-07-17

## 2020-07-15 RX ORDER — PROCHLORPERAZINE EDISYLATE 5 MG/ML
5-10 INJECTION INTRAMUSCULAR; INTRAVENOUS EVERY 4 HOURS PRN
Status: DISCONTINUED | OUTPATIENT
Start: 2020-07-15 | End: 2020-07-19 | Stop reason: HOSPADM

## 2020-07-15 RX ORDER — SODIUM CHLORIDE, SODIUM LACTATE, POTASSIUM CHLORIDE, AND CALCIUM CHLORIDE .6; .31; .03; .02 G/100ML; G/100ML; G/100ML; G/100ML
500 INJECTION, SOLUTION INTRAVENOUS
Status: DISCONTINUED | OUTPATIENT
Start: 2020-07-15 | End: 2020-07-19 | Stop reason: HOSPADM

## 2020-07-15 RX ORDER — NICOTINE 21 MG/24HR
21 PATCH, TRANSDERMAL 24 HOURS TRANSDERMAL
Status: DISCONTINUED | OUTPATIENT
Start: 2020-07-15 | End: 2020-07-19 | Stop reason: HOSPADM

## 2020-07-15 RX ORDER — AMOXICILLIN 250 MG
2 CAPSULE ORAL 2 TIMES DAILY
Status: DISCONTINUED | OUTPATIENT
Start: 2020-07-15 | End: 2020-07-19 | Stop reason: HOSPADM

## 2020-07-15 RX ORDER — DOXYCYCLINE 100 MG/1
100 TABLET ORAL EVERY 12 HOURS
Status: DISCONTINUED | OUTPATIENT
Start: 2020-07-15 | End: 2020-07-15

## 2020-07-15 RX ORDER — SODIUM CHLORIDE 9 MG/ML
INJECTION, SOLUTION INTRAVENOUS CONTINUOUS
Status: DISPENSED | OUTPATIENT
Start: 2020-07-15 | End: 2020-07-16

## 2020-07-15 RX ORDER — METHADONE HYDROCHLORIDE 5 MG/1
5 TABLET ORAL 2 TIMES DAILY
Status: DISCONTINUED | OUTPATIENT
Start: 2020-07-15 | End: 2020-07-17

## 2020-07-15 RX ORDER — BISACODYL 10 MG
10 SUPPOSITORY, RECTAL RECTAL
Status: DISCONTINUED | OUTPATIENT
Start: 2020-07-15 | End: 2020-07-19 | Stop reason: HOSPADM

## 2020-07-15 RX ORDER — ACETAMINOPHEN 325 MG/1
650 TABLET ORAL ONCE
Status: COMPLETED | OUTPATIENT
Start: 2020-07-15 | End: 2020-07-15

## 2020-07-15 RX ORDER — OXYCODONE HYDROCHLORIDE 5 MG/1
5 TABLET ORAL
Status: DISCONTINUED | OUTPATIENT
Start: 2020-07-15 | End: 2020-07-17

## 2020-07-15 RX ORDER — LORAZEPAM 2 MG/ML
0.5 INJECTION INTRAMUSCULAR EVERY 4 HOURS PRN
Status: DISCONTINUED | OUTPATIENT
Start: 2020-07-15 | End: 2020-07-18

## 2020-07-15 RX ORDER — PROMETHAZINE HYDROCHLORIDE 25 MG/1
12.5-25 TABLET ORAL EVERY 4 HOURS PRN
Status: DISCONTINUED | OUTPATIENT
Start: 2020-07-15 | End: 2020-07-19 | Stop reason: HOSPADM

## 2020-07-15 RX ORDER — SODIUM CHLORIDE 9 MG/ML
2000 INJECTION, SOLUTION INTRAVENOUS ONCE
Status: COMPLETED | OUTPATIENT
Start: 2020-07-15 | End: 2020-07-15

## 2020-07-15 RX ORDER — CLINDAMYCIN PHOSPHATE 600 MG/50ML
600 INJECTION, SOLUTION INTRAVENOUS EVERY 8 HOURS
Status: DISCONTINUED | OUTPATIENT
Start: 2020-07-15 | End: 2020-07-15

## 2020-07-15 RX ORDER — ACETAMINOPHEN 650 MG/1
650 SUPPOSITORY RECTAL ONCE
Status: DISCONTINUED | OUTPATIENT
Start: 2020-07-15 | End: 2020-07-15

## 2020-07-15 RX ORDER — ONDANSETRON 4 MG/1
4 TABLET, ORALLY DISINTEGRATING ORAL EVERY 4 HOURS PRN
Status: DISCONTINUED | OUTPATIENT
Start: 2020-07-15 | End: 2020-07-19 | Stop reason: HOSPADM

## 2020-07-15 RX ORDER — ONDANSETRON 2 MG/ML
4 INJECTION INTRAMUSCULAR; INTRAVENOUS ONCE
Status: COMPLETED | OUTPATIENT
Start: 2020-07-15 | End: 2020-07-15

## 2020-07-15 RX ORDER — AMOXICILLIN AND CLAVULANATE POTASSIUM 875; 125 MG/1; MG/1
1 TABLET, FILM COATED ORAL EVERY 12 HOURS
Status: CANCELLED | OUTPATIENT
Start: 2020-07-15 | End: 2020-07-20

## 2020-07-15 RX ADMIN — VANCOMYCIN HYDROCHLORIDE 1500 MG: 500 INJECTION, POWDER, LYOPHILIZED, FOR SOLUTION INTRAVENOUS at 04:55

## 2020-07-15 RX ADMIN — SODIUM CHLORIDE: 9 INJECTION, SOLUTION INTRAVENOUS at 16:16

## 2020-07-15 RX ADMIN — ACETAMINOPHEN 650 MG: 325 TABLET, FILM COATED ORAL at 21:16

## 2020-07-15 RX ADMIN — IOHEXOL 80 ML: 350 INJECTION, SOLUTION INTRAVENOUS at 03:55

## 2020-07-15 RX ADMIN — CEFTRIAXONE SODIUM 2 G: 2 INJECTION, POWDER, FOR SOLUTION INTRAMUSCULAR; INTRAVENOUS at 01:40

## 2020-07-15 RX ADMIN — DOXYCYCLINE 100 MG: 100 TABLET, FILM COATED ORAL at 11:07

## 2020-07-15 RX ADMIN — ONDANSETRON 4 MG: 2 INJECTION INTRAMUSCULAR; INTRAVENOUS at 00:34

## 2020-07-15 RX ADMIN — VANCOMYCIN HYDROCHLORIDE 1000 MG: 500 INJECTION, POWDER, LYOPHILIZED, FOR SOLUTION INTRAVENOUS at 16:42

## 2020-07-15 RX ADMIN — SODIUM CHLORIDE 2000 ML: 9 INJECTION, SOLUTION INTRAVENOUS at 03:30

## 2020-07-15 RX ADMIN — CLINDAMYCIN IN 5 PERCENT DEXTROSE 600 MG: 12 INJECTION, SOLUTION INTRAVENOUS at 10:12

## 2020-07-15 RX ADMIN — OXYCODONE 5 MG: 5 TABLET ORAL at 11:06

## 2020-07-15 RX ADMIN — METHADONE HYDROCHLORIDE 5 MG: 5 TABLET ORAL at 19:23

## 2020-07-15 RX ADMIN — SODIUM CHLORIDE: 9 INJECTION, SOLUTION INTRAVENOUS at 11:05

## 2020-07-15 RX ADMIN — SODIUM CHLORIDE, POTASSIUM CHLORIDE, SODIUM LACTATE AND CALCIUM CHLORIDE 1000 ML: 600; 310; 30; 20 INJECTION, SOLUTION INTRAVENOUS at 00:34

## 2020-07-15 RX ADMIN — SODIUM CHLORIDE 3 G: 900 INJECTION INTRAVENOUS at 11:07

## 2020-07-15 RX ADMIN — OXYCODONE 5 MG: 5 TABLET ORAL at 16:24

## 2020-07-15 RX ADMIN — ACETAMINOPHEN 650 MG: 325 TABLET, FILM COATED ORAL at 00:32

## 2020-07-15 RX ADMIN — SODIUM CHLORIDE: 9 INJECTION, SOLUTION INTRAVENOUS at 21:16

## 2020-07-15 RX ADMIN — SODIUM CHLORIDE: 9 INJECTION, SOLUTION INTRAVENOUS at 04:41

## 2020-07-15 RX ADMIN — LORAZEPAM 0.5 MG: 2 INJECTION INTRAMUSCULAR; INTRAVENOUS at 21:20

## 2020-07-15 ASSESSMENT — COGNITIVE AND FUNCTIONAL STATUS - GENERAL
SUGGESTED CMS G CODE MODIFIER DAILY ACTIVITY: CI
TOILETING: A LITTLE
WALKING IN HOSPITAL ROOM: A LITTLE
MOBILITY SCORE: 22
SUGGESTED CMS G CODE MODIFIER MOBILITY: CJ
DAILY ACTIVITIY SCORE: 23
CLIMB 3 TO 5 STEPS WITH RAILING: A LITTLE

## 2020-07-15 ASSESSMENT — ENCOUNTER SYMPTOMS
NERVOUS/ANXIOUS: 0
FOCAL WEAKNESS: 0
COUGH: 0
BACK PAIN: 0
BLURRED VISION: 0
VOMITING: 0
HEMOPTYSIS: 0
SPEECH CHANGE: 0
FEVER: 1
TREMORS: 0
PALPITATIONS: 0
PHOTOPHOBIA: 0
SPUTUM PRODUCTION: 0
POLYDIPSIA: 0
BRUISES/BLEEDS EASILY: 0
DOUBLE VISION: 0
CHILLS: 1
HEARTBURN: 0
MYALGIAS: 1
FLANK PAIN: 0
HEADACHES: 0
NAUSEA: 1
NECK PAIN: 0
HALLUCINATIONS: 0
ORTHOPNEA: 0
WEIGHT LOSS: 1

## 2020-07-15 ASSESSMENT — PATIENT HEALTH QUESTIONNAIRE - PHQ9
5. POOR APPETITE OR OVEREATING: NOT AT ALL
SUM OF ALL RESPONSES TO PHQ QUESTIONS 1-9: 2
2. FEELING DOWN, DEPRESSED, IRRITABLE, OR HOPELESS: SEVERAL DAYS
1. LITTLE INTEREST OR PLEASURE IN DOING THINGS: SEVERAL DAYS
3. TROUBLE FALLING OR STAYING ASLEEP OR SLEEPING TOO MUCH: NOT AT ALL
4. FEELING TIRED OR HAVING LITTLE ENERGY: NOT AT ALL
SUM OF ALL RESPONSES TO PHQ9 QUESTIONS 1 AND 2: 2
9. THOUGHTS THAT YOU WOULD BE BETTER OFF DEAD, OR OF HURTING YOURSELF: NOT AT ALL
6. FEELING BAD ABOUT YOURSELF - OR THAT YOU ARE A FAILURE OR HAVE LET YOURSELF OR YOUR FAMILY DOWN: NOT AL ALL
8. MOVING OR SPEAKING SO SLOWLY THAT OTHER PEOPLE COULD HAVE NOTICED. OR THE OPPOSITE, BEING SO FIGETY OR RESTLESS THAT YOU HAVE BEEN MOVING AROUND A LOT MORE THAN USUAL: NOT AT ALL
7. TROUBLE CONCENTRATING ON THINGS, SUCH AS READING THE NEWSPAPER OR WATCHING TELEVISION: NOT AT ALL

## 2020-07-15 ASSESSMENT — LIFESTYLE VARIABLES
DOES PATIENT WANT TO STOP DRINKING: NO
EVER_SMOKED: NEVER
EVER HAD A DRINK FIRST THING IN THE MORNING TO STEADY YOUR NERVES TO GET RID OF A HANGOVER: NO
CONSUMPTION TOTAL: NEGATIVE
SUBSTANCE_ABUSE: 1
EVER FELT BAD OR GUILTY ABOUT YOUR DRINKING: NO
TOTAL SCORE: 0
AVERAGE NUMBER OF DAYS PER WEEK YOU HAVE A DRINK CONTAINING ALCOHOL: 0
HAVE YOU EVER FELT YOU SHOULD CUT DOWN ON YOUR DRINKING: NO
TOTAL SCORE: 0
TOTAL SCORE: 0
ALCOHOL_USE: NO
ON A TYPICAL DAY WHEN YOU DRINK ALCOHOL HOW MANY DRINKS DO YOU HAVE: 0
HAVE PEOPLE ANNOYED YOU BY CRITICIZING YOUR DRINKING: NO
HOW MANY TIMES IN THE PAST YEAR HAVE YOU HAD 5 OR MORE DRINKS IN A DAY: 0

## 2020-07-15 ASSESSMENT — FIBROSIS 4 INDEX: FIB4 SCORE: 1.06

## 2020-07-15 ASSESSMENT — COPD QUESTIONNAIRES
DO YOU EVER COUGH UP ANY MUCUS OR PHLEGM?: NO/ONLY WITH OCCASIONAL COLDS OR INFECTIONS
DURING THE PAST 4 WEEKS HOW MUCH DID YOU FEEL SHORT OF BREATH: NONE/LITTLE OF THE TIME
COPD SCREENING SCORE: 0
IN THE PAST 12 MONTHS DO YOU DO LESS THAN YOU USED TO BECAUSE OF YOUR BREATHING PROBLEMS: DISAGREE/UNSURE
HAVE YOU SMOKED AT LEAST 100 CIGARETTES IN YOUR ENTIRE LIFE: NO/DON'T KNOW

## 2020-07-15 NOTE — PROGRESS NOTES
The patient was admitted earlier for sepsis related to left upper extremity cellulitis.  Blood pressure is a little bit low.  I increased IV fluid rate to 200 cc/h and will titrate as needed depending on his blood pressure.  I also change antibiotic to doxycycline instead of clindamycin.  Marked the border of cellulitis area.  Check urine drug screen.

## 2020-07-15 NOTE — ASSESSMENT & PLAN NOTE
This is Sepsis Present on admission  SIRS criteria identified on my evaluation include: Fever, with temperature greater than 101 deg F, Tachycardia, with heart rate greater than 90 BPM and Leukocytosis, with WBC greater than 12,000  Source is left arm cellulitis  Sepsis protocol initiated  Fluid resuscitation ordered per protocol  IV antibiotics as appropriate for source of sepsis  While organ dysfunction may be noted elsewhere in this problem list or in the chart, degree of organ dysfunction does not meet CMS criteria for severe sepsis

## 2020-07-15 NOTE — DIETARY
"Nutrition Services: Day 0 of admit. Navin Kapoor is a 25 y.o. male with admitting DX of left arm cellulitis, sepsis.   Consult received for 2-13 lb weight loss x <1 week, poor appetite.     PTA pt reports not eating for a \"couple days\" since he could not afford food. At this time, pt stated he has a good appetite. However, 25% of breakfast tray consumed per RD visualization. Pt then said he did not want to answer anymore questions since this interview was \"unnecessary\" and he will \"fill [his] face\" once he discharges. RN notified pt may benefit from meeting with  to see if he qualifies for food stamps.     Assessment:  Ht: 175.3 cm, Wt 7/15: 57.7 kg (127 lb 3.3 oz) via stand up scale, BMI: 18.78 - normal  Diet/Intake: regular - Per chart no pt PO recorded to assess    Evaluation:   1. PMHx: IV drug abuse (heroin and methamphetamine)  2. Weight was 50 kg (110 lb 3.7 oz) on 3/3/2020. Weight has increased.   3. Fluids: NS @ 200 ml/hr  4. Last BM: 7/13    Malnutrition Risk: Not enough information to discern at this time.     Recommendations/Plan:   1. Encourage intake of meals.  2. Document intake of all meals as % taken in ADL's to provide interdisciplinary communication across all shifts.   3. Monitor weight.  4. Nutrition rep will continue to see patient for ongoing meal and snack preferences.  5. Obtain supplement order per RD as needed.    RD following.     "

## 2020-07-15 NOTE — H&P
Hospital Medicine History & Physical Note    Date of Service  7/15/2020    Primary Care Physician  No primary care provider on file.    Code Status  Full Code    Chief Complaint  Chief Complaint   Patient presents with   • Fever   • Wound Check     L arm        History of Presenting Illness  25 y.o. 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  Patient has been using his left arm for IV injection of heroin and methamphetamine on a regular basis.  He met sepsis criteria with fever 102.7, tachycardia 129, white blood cells 15 K.  His inflammatory marker is elevated.  His LFT elevated, as it might indicate deep tissue/muscle infection.  I am concerned about possibility of necrotizing fasciitis and discussed this with ERP, who will consult orthopedic surgery.    Review of Systems  Review of Systems   Constitutional: Positive for chills, fever, malaise/fatigue and weight loss.   HENT: Negative for ear pain, hearing loss and tinnitus.    Eyes: Negative for blurred vision, double vision and photophobia.   Respiratory: Negative for cough, hemoptysis and sputum production.    Cardiovascular: Negative for chest pain, palpitations and orthopnea.   Gastrointestinal: Positive for nausea. Negative for heartburn and vomiting.   Genitourinary: Negative for dysuria, flank pain, frequency and hematuria.   Musculoskeletal: Positive for joint pain and myalgias. Negative for back pain and neck pain.   Skin: Negative for itching and rash.   Neurological: Negative for tremors, speech change, focal weakness and headaches.   Endo/Heme/Allergies: Negative for environmental allergies and polydipsia. Does not bruise/bleed easily.   Psychiatric/Behavioral: Positive for substance abuse. Negative for hallucinations. The patient is not nervous/anxious.        Past Medical History   has  no past medical history on file.    Surgical History   has a past surgical history that includes incision and drainage general (Right, 3/3/2020).     Family History  family history is not on file.     Social History   reports that he has been smoking. He has never used smokeless tobacco. He reports previous alcohol use. He reports current drug use. Drugs: Inhaled and Injected (Skin Popping).    Allergies  No Known Allergies    Medications  Prior to Admission Medications   Prescriptions Last Dose Informant Patient Reported? Taking?   acetaminophen (TYLENOL) 325 MG Tab   No No   Sig: Take 2 Tabs by mouth every 6 hours as needed (Mild Pain; (Pain scale 1-3); Temp greater than 100.5 F).   multivitamin (THERAGRAN) Tab  Patient Yes No   Sig: Take 1 Tab by mouth every day.      Facility-Administered Medications: None       Physical Exam  Temp:  [37.6 °C (99.7 °F)-39.3 °C (102.7 °F)] 37.6 °C (99.7 °F)  Pulse:  [] 96  Resp:  [12-20] 14  BP: ()/(46-72) 96/46  SpO2:  [90 %-99 %] 99 %    Physical Exam  Vitals signs and nursing note reviewed.   Constitutional:       General: He is not in acute distress.     Appearance: Normal appearance. He is ill-appearing and toxic-appearing.   HENT:      Head: Normocephalic and atraumatic.      Nose: Nose normal.      Mouth/Throat:      Mouth: Mucous membranes are moist.   Eyes:      Extraocular Movements: Extraocular movements intact.      Pupils: Pupils are equal, round, and reactive to light.   Neck:      Musculoskeletal: Normal range of motion and neck supple.   Cardiovascular:      Rate and Rhythm: Normal rate and regular rhythm.   Pulmonary:      Effort: Pulmonary effort is normal.      Breath sounds: Normal breath sounds.   Abdominal:      General: Abdomen is flat. There is no distension.      Tenderness: There is no abdominal tenderness.   Musculoskeletal: Normal range of motion.         General: No swelling or deformity.      Comments: Blanching erythema, swelling,  induration and tenderness to palpation of the left arm and forearm.  No fluctuance appreciated   Skin:     General: Skin is warm and dry.   Neurological:      General: No focal deficit present.      Mental Status: He is alert and oriented to person, place, and time.   Psychiatric:         Mood and Affect: Mood normal.         Behavior: Behavior normal.         Laboratory:  Recent Labs     07/14/20  2334   WBC 15.4*   RBC 4.40*   HEMOGLOBIN 13.8*   HEMATOCRIT 39.8*   MCV 90.5   MCH 31.4   MCHC 34.7   RDW 44.5   PLATELETCT 234   MPV 10.1     Recent Labs     07/14/20  2334   SODIUM 132*   POTASSIUM 4.0   CHLORIDE 94*   CO2 23   GLUCOSE 137*   BUN 14   CREATININE 1.07   CALCIUM 9.2     Recent Labs     07/14/20  2334   ALTSGPT 94*   ASTSGOT 89*   ALKPHOSPHAT 85   TBILIRUBIN 0.9   GLUCOSE 137*         No results for input(s): NTPROBNP in the last 72 hours.      No results for input(s): TROPONINT in the last 72 hours.    Imaging:  US-EXTREMITY VENOUS UPPER UNILAT LEFT         DX-CHEST-PORTABLE (1 VIEW)   Final Result         1.  No acute cardiopulmonary disease.      CT-EXTREMITY, UPPER WITH LEFT    (Results Pending)         Assessment/Plan:      Sepsis (HCC)  Assessment & Plan  This is Sepsis Present on admission  SIRS criteria identified on my evaluation include: Fever, with temperature greater than 101 deg F, Tachycardia, with heart rate greater than 90 BPM and Leukocytosis, with WBC greater than 12,000  Source is left arm cellulitis  Sepsis protocol initiated  Fluid resuscitation ordered per protocol  IV antibiotics as appropriate for source of sepsis  While organ dysfunction may be noted elsewhere in this problem list or in the chart, degree of organ dysfunction does not meet CMS criteria for severe sepsis          Left arm cellulitis  Assessment & Plan  Associated with sepsis  Ordered CT with contrast to evaluate for signs of abscess or necrotizing fasciitis  Orthopedic surgery will be consulted by ERP.  Empiric  antibiotics    Heroin abuse (HCC)- (present on admission)  Assessment & Plan  P.o. and IV opiates and lorazepam as needed for withdrawal symptoms  Cessation counseling

## 2020-07-15 NOTE — ASSESSMENT & PLAN NOTE
Associated with sepsis  Ordered CT : cellulitis  Duplex usg: benign, no dvt  thrombophlebitis  Empiric antibiotics with IV vancomycin  Improving  Blood culture negative to date

## 2020-07-15 NOTE — ED NOTES
PT BACK FROM [facility], ASSESSED THIS RN AGREES WITH TRIAGE. PLACED ON MONITOR, TACHY HR, FEBILE AND SLIGHT HYPOTENSION NOTED. IV PLACED, LABS AND BLOOD CULTURES X2 DRAWN. AWAITING MD ASSESSMENT AND FURTHER ORDERS. CALL LIGHT CHRIS REACH

## 2020-07-15 NOTE — PROGRESS NOTES
Pt transferred to the floor. Cardiac monitor placed. Pt is in sinus rhythm. Pt is A&Ox4. RA. POC discussed with patient. Bedside table and call light within reach. Assessment performed. No further needs stated at this time.

## 2020-07-15 NOTE — CARE PLAN
Problem: Communication  Goal: The ability to communicate needs accurately and effectively will improve  Outcome: PROGRESSING AS EXPECTED  Intervention: [place] patient and significant other/support system to call light to alert staff of needs  Note: Pt educated on POC and medications. Pt verbalized understanding.      Problem: Safety  Goal: Will remain free from injury  Outcome: PROGRESSING AS EXPECTED  Intervention: Provide assistance with mobility  Note: Pt bedside table and call-light are within reach, bed in lowest position and locked. Treaded socks on and pt has been educated on call light use and asked to call before getting up.

## 2020-07-15 NOTE — PROGRESS NOTES
2 RN skin check completed with Maida PARRISH.  Devices in place oxygen tubing.  Skin assessed under devices Yes.  Confirmed pressure ulcers found on N/A.  New potential pressure ulcers noted on N/A. Wound consult placed N/A.  The following interventions in place foam padding placed on patients oxygen tubing    Pts ears are red and blanching bilaterally  Sacrum is pink and blanching  Heels are dry and blanching bilaterally  LUE red and swollen

## 2020-07-15 NOTE — ED NOTES
PT BP HYPOTENSIVE, HOSPITALIST UPDATED TO REQUEST FLUIDS AND CONFIRM IF MEDICAL FLOOR IS MOST APPROPRIATE. VERBAL ORDERS RECEIVED AND LEVEL OF CARE CHANGED.

## 2020-07-15 NOTE — PROGRESS NOTES
"Pharmacy Kinetics 25 y.o. male on vancomycin day # 1   7/15/2020    Currently on Vancomycin LD 1500 mg iv x1 (0500)  Provider specified end date: 7 days    Indication for Treatment: SSTI     Pertinent history per medical record: Admitted on 2020 for left arm cellulitis presenting with sepsis on admission. Pt has hx of IVDU.     Other antibiotics: clindamycin 600 mg iv q8h     Allergies: Patient has no known allergies.     List concerns for renal function: abnormal LFTs, hypotension, SIRS    Pertinent cultures to date:   7/15 - blood & urine cx - pending     MRSA nares swab if pneumonia is a concern (ordered/positive/negative/n-a): N/A    Recent Labs     20  2334   WBC 15.4*   NEUTSPOLYS 89.70*     Recent Labs     20  2334   BUN 14   CREATININE 1.07   ALBUMIN 4.3     No results for input(s): VANCOTROUGH, VANCOPEAK, VANCORANDOM in the last 72 hours.    Intake/Output Summary (Last 24 hours) at 7/15/2020 0252  Last data filed at 7/15/2020 0238  Gross per 24 hour   Intake 1100 ml   Output no documentation   Net 1100 ml      Blood Pressure (Abnormal) 96/46   Pulse 96   Temperature 37.6 °C (99.7 °F) (Oral)   Respiration 14   Height 1.753 m (5' 9\")   Weight 57.5 kg (126 lb 12.2 oz)   Oxygen Saturation 99%  Temp (24hrs), Av.4 °C (101.2 °F), Min:37.6 °C (99.7 °F), Max:39.3 °C (102.7 °F)      A/P    1. Vancomycin dose change: new start vanco  2. Vancomycin maintenance dose: 750 mg iv q8h (0500, 1300, 2100)  3. Next vancomycin level: TBD rounding pharmacist in the AM - not ordered yet  4. Goal trough: 10-15 mcg/mL  5. Comments: Pt previously tolerated recommended maintenance dose stated above and reached therapeutic goal trough range. Pharmacy will continue to follow and recommend de-escalation of antibiotics as appropriate.      Mireya Aburto, PharmD      "

## 2020-07-15 NOTE — PROGRESS NOTES
Bedside report received from [facility]. POC discussed with pt; all questions answered at this time. Patient needs addressed.  Fall Precautions in place

## 2020-07-15 NOTE — ED PROVIDER NOTES
ED Provider Note    CHIEF COMPLAINT  Chief Complaint   Patient presents with   • Fever   • Wound Check     L arm         HPI    Primary care provider: No primary care provider on file.   History obtained from: Patient   History limited by: None     Navin Kapoor is a 25 y.o. male with PMHx of IVDU who presents to the ED with fever and left arm pain.  Patient admits to using heroin and meth yesterday.  He shot up his left forearm yesterday, but he also admits to shooting up the same spot a couple days ago.  He complains of redness and itching around his left arm.  There also may be a possible rash around left arm but unable to distinguish between itchiness and actual rash.  He notes that the rash first started right after shooting up.  According to the patient he is also staying at the [employer] and is concerned about seeing bugs underneath his skin.  He previously had an I&D for the right buttocks abscess back in March 2020.    He endorses some headache, dizziness, nausea, numbness, and muscle weakness.  Denies chest pain, shortness of breath, constipation, diarrhea, hearing or vision changes, night sweats and chills.    REVIEW OF SYSTEMS  Please see HPI for pertinent positives/negatives.  All other systems reviewed and are negative.     PAST MEDICAL HISTORY  No past medical history on file.     SURGICAL HISTORY  Past Surgical History:   Procedure Laterality Date   • INCISION AND DRAINAGE GENERAL Right 3/3/2020    Procedure: INCISION AND DRAINAGE;  Surgeon: John Hinton M.D.;  Location: SURGERY [facility];  Service: General        SOCIAL HISTORY  Social History     Tobacco Use   • Smoking status: Current Some Day Smoker   • Smokeless tobacco: Never Used   Substance and Sexual Activity   • Alcohol use: Not Currently   • Drug use: Yes     Types: Inhaled, Injected (Skin Popping)     Comment: heroin and meth- last used yesterday   • Sexual activity: Not on file        FAMILY HISTORY  History  "reviewed. No pertinent family history.     CURRENT MEDICATIONS  Home Medications    **Home medications have not yet been reviewed for this encounter**          ALLERGIES  No Known Allergies     PHYSICAL EXAM  VITAL SIGNS: BP (!) 97/46   Pulse 83   Temp 37.6 °C (99.7 °F) (Oral)   Resp 15   Ht 1.753 m (5' 9\")   Wt 57.5 kg (126 lb 12.2 oz)   SpO2 99%   BMI 18.72 kg/m²    Physical Exam   Constitutional: He is oriented to person, place, and time. He appears distressed.   Mildly malnourished.   HENT:   Head: Normocephalic and atraumatic.   Mouth/Throat: Oropharynx is clear and moist.   Eyes: Pupils are equal, round, and reactive to light. Conjunctivae and EOM are normal.   Cardiovascular: Regular rhythm.   Tachycardic   Pulmonary/Chest: Effort normal and breath sounds normal. No respiratory distress.   Abdominal: Soft. Bowel sounds are normal. He exhibits no distension. There is no abdominal tenderness. There is no rebound.   Musculoskeletal:      Comments: Mild muscle weakness in L arm, most likely 2/2 pain   Neurological: He is alert and oriented to person, place, and time.   Skin: Skin is warm and dry. Rash noted. There is erythema.   Psychiatric: He has a normal mood and affect.        Pulse ox interpretation: 99% I interpret this pulse ox as normal       DIAGNOSTIC STUDIES / PROCEDURES        LABS  All labs reviewed by me.     Results for orders placed or performed during the hospital encounter of 07/14/20   Lactic acid (lactate)   Result Value Ref Range    Lactic Acid 1.7 0.5 - 2.0 mmol/L   Lactic acid (lactate): Repeat if initial lactic acid result is greater than 2   Result Value Ref Range    Lactic Acid 2.1 (H) 0.5 - 2.0 mmol/L   CBC WITH DIFFERENTIAL   Result Value Ref Range    WBC 15.4 (H) 4.8 - 10.8 K/uL    RBC 4.40 (L) 4.70 - 6.10 M/uL    Hemoglobin 13.8 (L) 14.0 - 18.0 g/dL    Hematocrit 39.8 (L) 42.0 - 52.0 %    MCV 90.5 81.4 - 97.8 fL    MCH 31.4 27.0 - 33.0 pg    MCHC 34.7 33.7 - 35.3 g/dL    RDW " 44.5 35.9 - 50.0 fL    Platelet Count 234 164 - 446 K/uL    MPV 10.1 9.0 - 12.9 fL    Neutrophils-Polys 89.70 (H) 44.00 - 72.00 %    Lymphocytes 2.70 (L) 22.00 - 41.00 %    Monocytes 5.50 0.00 - 13.40 %    Eosinophils 1.30 0.00 - 6.90 %    Basophils 0.30 0.00 - 1.80 %    Immature Granulocytes 0.50 0.00 - 0.90 %    Nucleated RBC 0.00 /100 WBC    Neutrophils (Absolute) 13.78 (H) 1.82 - 7.42 K/uL    Lymphs (Absolute) 0.41 (L) 1.00 - 4.80 K/uL    Monos (Absolute) 0.85 0.00 - 0.85 K/uL    Eos (Absolute) 0.20 0.00 - 0.51 K/uL    Baso (Absolute) 0.05 0.00 - 0.12 K/uL    Immature Granulocytes (abs) 0.08 0.00 - 0.11 K/uL    NRBC (Absolute) 0.00 K/uL   COMP METABOLIC PANEL   Result Value Ref Range    Sodium 132 (L) 135 - 145 mmol/L    Potassium 4.0 3.6 - 5.5 mmol/L    Chloride 94 (L) 96 - 112 mmol/L    Co2 23 20 - 33 mmol/L    Anion Gap 15.0 7.0 - 16.0    Glucose 137 (H) 65 - 99 mg/dL    Bun 14 8 - 22 mg/dL    Creatinine 1.07 0.50 - 1.40 mg/dL    Calcium 9.2 8.5 - 10.5 mg/dL    AST(SGOT) 89 (H) 12 - 45 U/L    ALT(SGPT) 94 (H) 2 - 50 U/L    Alkaline Phosphatase 85 30 - 99 U/L    Total Bilirubin 0.9 0.1 - 1.5 mg/dL    Albumin 4.3 3.2 - 4.9 g/dL    Total Protein 7.1 6.0 - 8.2 g/dL    Globulin 2.8 1.9 - 3.5 g/dL    A-G Ratio 1.5 g/dL   PROCALCITONIN   Result Value Ref Range    Procalcitonin 1.04 (H) <0.25 ng/mL   CRP QUANTITIVE (NON-CARDIAC)   Result Value Ref Range    Stat C-Reactive Protein 21.33 (H) 0.00 - 0.75 mg/dL   Sed Rate   Result Value Ref Range    Sed Rate Westergren 35 (H) 0 - 15 mm/hour   ESTIMATED GFR   Result Value Ref Range    GFR If African American >60 >60 mL/min/1.73 m 2    GFR If Non African American >60 >60 mL/min/1.73 m 2        RADIOLOGY  The radiologist's interpretation of all radiological studies have been reviewed by me.     US-EXTREMITY VENOUS UPPER UNILAT LEFT   Final Result      DX-CHEST-PORTABLE (1 VIEW)   Final Result         1.  No acute cardiopulmonary disease.      CT-EXTREMITY, UPPER WITH LEFT     (Results Pending)          COURSE & MEDICAL DECISION MAKING  Nursing notes, VS, PMSFHx reviewed in chart.     Mr. Grimes is a 25-year-old male with a past medical history of polysubstance abuse coming to the ED for left arm pain, erythema, rash that started yesterday.  Cellulitis showed up yesterday.  However, he also admits to shooting of the same spot in his left arm around 3 days ago.  Due to his history of IV drug use we were considering endocarditis as a possible etiology.  Therefore we ordered a sepsis work-up, as well as ordered left ultrasound of his arm.  In the meanwhile we have ordered him some lactated Ringer,Tylenol for his pain, and Zofran for his nausea.  We will consult the hospitalist team for possible admission for sepsis or endocarditis work-up.      Differential diagnoses considered include but are not limited to: endocarditis vs sepsis vs cellulitis vs necrotizing fasciitis  vs pneumonia       0225: D/W Dr. Boggs, hospitalist, who will admit patient.  He would like orthopedic surgeon consulted due to concern for necrotizing fasciitis.    0300: D/W Dr. Mcnally, orthopedic surgeon on call.  He asked that general surgeon be consulted if concern for necrotizing fasciitis.    0320: D/W Dr. Yang, general surgeon on call.  He can be consulted if findings of necrotizing fasciitis on CT scan.      History and physical exam as above.  Patient presented with swelling and redness of his left upper arm and admits to using IV heroin and meth yesterday.  He was also noted to have fever and tachycardia and triggered our sepsis protocol which was initiated.  Chest x-ray without evidence for acute abnormality.  Ultrasound of the left upper extremity without evidence for DVT.  Labs were significant for leukocytosis, elevated procalcitonin, CRP and sed rate.  He appears to have a cellulitis of his left upper arm rather than necrotizing fasciitis.  No clinical findings of drainable abscess.  Patient is also  high risk for endocarditis.  He was empirically started on vancomycin and Rocephin.  I discussed the findings with the patient and he is agreeable to admission.  Discussed with Dr. Boggs who graciously agreed to admit patient for further care.      HYDRATION: Based on the patient's presentation of Tachycardia the patient was given IV fluids. IV Hydration was used because oral hydration was not as rapid as required. Upon recheck following hydration, the patient was improving.    FINAL IMPRESSION  1. Fever, unspecified fever cause Acute   2. IV drug user Active   3. Pain of left upper extremity Acute   4. Cellulitis of left upper extremity Acute          DISPOSITION  Patient will be admitted by hospitalist for further care      Electronically signed by: Cortez Adams D.O., 7/14/2020 11:54 PM      Portions of this record were made with voice recognition software.  Despite my review, spelling/grammar/context errors may still remain.  Interpretation of this chart should be taken in this context.       I independently evaluated the patient and repeated the important components of the history and physical exam.  I discussed the management with the resident.  I have reviewed and agree with the pertinent clinical information above including history, exam, study findings, and recommendations.    Cortez Adams D.O.

## 2020-07-15 NOTE — CARE PLAN
Problem: Nutritional:  Goal: Achieve adequate nutritional intake  Description: Patient will consume >50% of meals  Outcome: NOT MET      [Negative] : Psychiatric [Palpitations] : no palpitations [SOB on Exertion] : no shortness of breath during exertion

## 2020-07-15 NOTE — ED TRIAGE NOTES
Navin Kapoor  25 y.o.  Chief Complaint   Patient presents with   • Fever   • Wound Check     L arm      Pt shot up in L arm yesterday.     Charge nurse aware of pt's sepsis score and acuity.

## 2020-07-16 PROBLEM — R74.8 ELEVATED LIVER ENZYMES: Status: ACTIVE | Noted: 2020-07-16

## 2020-07-16 LAB
ALBUMIN SERPL BCP-MCNC: 2.9 G/DL (ref 3.2–4.9)
ALBUMIN/GLOB SERPL: 1.2 G/DL
ALP SERPL-CCNC: 178 U/L (ref 30–99)
ALT SERPL-CCNC: 178 U/L (ref 2–50)
ANION GAP SERPL CALC-SCNC: 11 MMOL/L (ref 7–16)
AST SERPL-CCNC: 177 U/L (ref 12–45)
BASOPHILS # BLD AUTO: 0.1 % (ref 0–1.8)
BASOPHILS # BLD: 0.01 K/UL (ref 0–0.12)
BILIRUB SERPL-MCNC: 1.1 MG/DL (ref 0.1–1.5)
BUN SERPL-MCNC: 6 MG/DL (ref 8–22)
CALCIUM SERPL-MCNC: 8 MG/DL (ref 8.5–10.5)
CHLORIDE SERPL-SCNC: 105 MMOL/L (ref 96–112)
CO2 SERPL-SCNC: 20 MMOL/L (ref 20–33)
CREAT SERPL-MCNC: 0.67 MG/DL (ref 0.5–1.4)
EKG IMPRESSION: NORMAL
EOSINOPHIL # BLD AUTO: 0.29 K/UL (ref 0–0.51)
EOSINOPHIL NFR BLD: 3.9 % (ref 0–6.9)
ERYTHROCYTE [DISTWIDTH] IN BLOOD BY AUTOMATED COUNT: 49.5 FL (ref 35.9–50)
GLOBULIN SER CALC-MCNC: 2.4 G/DL (ref 1.9–3.5)
GLUCOSE SERPL-MCNC: 90 MG/DL (ref 65–99)
HCT VFR BLD AUTO: 35.5 % (ref 42–52)
HGB BLD-MCNC: 11.5 G/DL (ref 14–18)
IMM GRANULOCYTES # BLD AUTO: 0.03 K/UL (ref 0–0.11)
IMM GRANULOCYTES NFR BLD AUTO: 0.4 % (ref 0–0.9)
LYMPHOCYTES # BLD AUTO: 0.49 K/UL (ref 1–4.8)
LYMPHOCYTES NFR BLD: 6.5 % (ref 22–41)
MCH RBC QN AUTO: 31.1 PG (ref 27–33)
MCHC RBC AUTO-ENTMCNC: 32.4 G/DL (ref 33.7–35.3)
MCV RBC AUTO: 95.9 FL (ref 81.4–97.8)
MONOCYTES # BLD AUTO: 0.67 K/UL (ref 0–0.85)
MONOCYTES NFR BLD AUTO: 8.9 % (ref 0–13.4)
NEUTROPHILS # BLD AUTO: 6.01 K/UL (ref 1.82–7.42)
NEUTROPHILS NFR BLD: 80.2 % (ref 44–72)
NRBC # BLD AUTO: 0 K/UL
NRBC BLD-RTO: 0 /100 WBC
PLATELET # BLD AUTO: 169 K/UL (ref 164–446)
PMV BLD AUTO: 11.3 FL (ref 9–12.9)
POTASSIUM SERPL-SCNC: 3.6 MMOL/L (ref 3.6–5.5)
PROT SERPL-MCNC: 5.3 G/DL (ref 6–8.2)
RBC # BLD AUTO: 3.7 M/UL (ref 4.7–6.1)
SODIUM SERPL-SCNC: 136 MMOL/L (ref 135–145)
WBC # BLD AUTO: 7.5 K/UL (ref 4.8–10.8)

## 2020-07-16 PROCEDURE — A9270 NON-COVERED ITEM OR SERVICE: HCPCS | Performed by: INTERNAL MEDICINE

## 2020-07-16 PROCEDURE — 93010 ELECTROCARDIOGRAM REPORT: CPT | Performed by: INTERNAL MEDICINE

## 2020-07-16 PROCEDURE — 700105 HCHG RX REV CODE 258: Performed by: INTERNAL MEDICINE

## 2020-07-16 PROCEDURE — 700102 HCHG RX REV CODE 250 W/ 637 OVERRIDE(OP): Performed by: INTERNAL MEDICINE

## 2020-07-16 PROCEDURE — 36415 COLL VENOUS BLD VENIPUNCTURE: CPT

## 2020-07-16 PROCEDURE — 700111 HCHG RX REV CODE 636 W/ 250 OVERRIDE (IP): Performed by: INTERNAL MEDICINE

## 2020-07-16 PROCEDURE — 80053 COMPREHEN METABOLIC PANEL: CPT

## 2020-07-16 PROCEDURE — 99233 SBSQ HOSP IP/OBS HIGH 50: CPT | Performed by: INTERNAL MEDICINE

## 2020-07-16 PROCEDURE — 85025 COMPLETE CBC W/AUTO DIFF WBC: CPT

## 2020-07-16 PROCEDURE — 770020 HCHG ROOM/CARE - TELE (206)

## 2020-07-16 PROCEDURE — 93005 ELECTROCARDIOGRAM TRACING: CPT | Performed by: NURSE PRACTITIONER

## 2020-07-16 RX ADMIN — METHADONE HYDROCHLORIDE 5 MG: 5 TABLET ORAL at 05:32

## 2020-07-16 RX ADMIN — METHADONE HYDROCHLORIDE 5 MG: 5 TABLET ORAL at 17:16

## 2020-07-16 RX ADMIN — VANCOMYCIN HYDROCHLORIDE 750 MG: 500 INJECTION, POWDER, LYOPHILIZED, FOR SOLUTION INTRAVENOUS at 10:10

## 2020-07-16 RX ADMIN — VANCOMYCIN HYDROCHLORIDE 750 MG: 500 INJECTION, POWDER, LYOPHILIZED, FOR SOLUTION INTRAVENOUS at 17:16

## 2020-07-16 RX ADMIN — LORAZEPAM 0.5 MG: 2 INJECTION INTRAMUSCULAR; INTRAVENOUS at 21:49

## 2020-07-16 RX ADMIN — SODIUM CHLORIDE: 9 INJECTION, SOLUTION INTRAVENOUS at 03:24

## 2020-07-16 RX ADMIN — ENOXAPARIN SODIUM 40 MG: 40 INJECTION SUBCUTANEOUS at 05:33

## 2020-07-16 RX ADMIN — SODIUM CHLORIDE: 9 INJECTION, SOLUTION INTRAVENOUS at 10:10

## 2020-07-16 RX ADMIN — VANCOMYCIN HYDROCHLORIDE 750 MG: 500 INJECTION, POWDER, LYOPHILIZED, FOR SOLUTION INTRAVENOUS at 03:24

## 2020-07-16 RX ADMIN — OXYCODONE 5 MG: 5 TABLET ORAL at 13:06

## 2020-07-16 ASSESSMENT — ENCOUNTER SYMPTOMS
COUGH: 0
VOMITING: 0
BACK PAIN: 0
SEIZURES: 0
NERVOUS/ANXIOUS: 0
FOCAL WEAKNESS: 0
BLURRED VISION: 0
NAUSEA: 0
DIZZINESS: 0
HEADACHES: 0
DEPRESSION: 0
CHILLS: 0
WEIGHT LOSS: 0
SHORTNESS OF BREATH: 0
PALPITATIONS: 0
SPUTUM PRODUCTION: 0
EYE PAIN: 0
MYALGIAS: 0
FEVER: 0
EYE REDNESS: 0
NECK PAIN: 0
HEARTBURN: 0
ABDOMINAL PAIN: 0
INSOMNIA: 0
ORTHOPNEA: 0
EYE DISCHARGE: 0
DIARRHEA: 0
STRIDOR: 0

## 2020-07-16 ASSESSMENT — FIBROSIS 4 INDEX: FIB4 SCORE: 2.12

## 2020-07-16 NOTE — PROGRESS NOTES
Monitor summary. .18/.10/.38 Sinus Moe-Sinus Rhythm 54-94, touched down to 39 non sustained. w/ rare PACs,  rare 1.3-1.4 sps

## 2020-07-16 NOTE — PROGRESS NOTES
[facility] Medicine Daily Progress Note    Date of Service  7/16/2020    Chief Complaint  25 y.o. male admitted 7/14/2020 with cellulitis of left arm    Hospital Course    25 y.o. 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  Patient has been using his left arm for IV injection of heroin and methamphetamine on a regular basis      Interval Problem Update  His skin redness is improving. Urine drug is positive for methamphetamine. Continue abx    Consultants/Specialty  none    Code Status  full    Disposition  Remain on the floor    Review of Systems  Review of Systems   Constitutional: Negative for chills, fever and weight loss.   HENT: Negative for congestion and nosebleeds.    Eyes: Negative for blurred vision, pain, discharge and redness.   Respiratory: Negative for cough, sputum production, shortness of breath and stridor.    Cardiovascular: Negative for chest pain, palpitations and orthopnea.   Gastrointestinal: Negative for abdominal pain, diarrhea, heartburn, nausea and vomiting.   Genitourinary: Negative for dysuria, frequency and urgency.   Musculoskeletal: Negative for back pain, myalgias and neck pain.   Skin: Negative for itching and rash.   Neurological: Negative for dizziness, focal weakness, seizures and headaches.   Psychiatric/Behavioral: Negative for depression. The patient is not nervous/anxious and does not have insomnia.         Physical Exam  Temp:  [37.4 °C (99.3 °F)-37.9 °C (100.3 °F)] 37.6 °C (99.7 °F)  Pulse:  [77-91] 91  Resp:  [16-18] 18  BP: ()/(38-76) 108/76  SpO2:  [94 %-97 %] 96 %    Physical Exam  Vitals signs reviewed.   Constitutional:       General: He is not in acute distress.     Appearance: Normal appearance.   HENT:      Head: Normocephalic and atraumatic.      Nose: No congestion or rhinorrhea.   Eyes:       Extraocular Movements: Extraocular movements intact.      Pupils: Pupils are equal, round, and reactive to light.   Neck:      Musculoskeletal: Normal range of motion and neck supple.   Cardiovascular:      Rate and Rhythm: Normal rate and regular rhythm.      Pulses: Normal pulses.   Pulmonary:      Effort: Pulmonary effort is normal. No respiratory distress.      Breath sounds: Normal breath sounds.   Abdominal:      General: Bowel sounds are normal. There is no distension.      Palpations: Abdomen is soft.      Tenderness: There is no abdominal tenderness.   Musculoskeletal:         General: No swelling or tenderness.   Skin:     General: Skin is warm.      Findings: No erythema.      Comments: Left arm skin redness with marked border   Neurological:      General: No focal deficit present.      Mental Status: He is alert and oriented to person, place, and time.         Fluids    Intake/Output Summary (Last 24 hours) at 7/16/2020 0751  Last data filed at 7/16/2020 0420  Gross per 24 hour   Intake --   Output 2100 ml   Net -2100 ml       Laboratory  Recent Labs     07/14/20  2334 07/16/20  0133   WBC 15.4* 7.5   RBC 4.40* 3.70*   HEMOGLOBIN 13.8* 11.5*   HEMATOCRIT 39.8* 35.5*   MCV 90.5 95.9   MCH 31.4 31.1   MCHC 34.7 32.4*   RDW 44.5 49.5   PLATELETCT 234 169   MPV 10.1 11.3     Recent Labs     07/14/20  2334 07/16/20  0521   SODIUM 132* 136   POTASSIUM 4.0 3.6   CHLORIDE 94* 105   CO2 23 20   GLUCOSE 137* 90   BUN 14 6*   CREATININE 1.07 0.67   CALCIUM 9.2 8.0*     Recent Labs     07/15/20  0517   INR 1.28*               Imaging  CT-EXTREMITY, UPPER WITH LEFT   Final Result         1.  Subcutaneous fat stranding in the medial, ulnar, and volar arm near the elbow. Appearance favors cellulitis. No focal fluid collection to suggest abscess is seen.      US-EXTREMITY VENOUS UPPER UNILAT LEFT   Final Result      DX-CHEST-PORTABLE (1 VIEW)   Final Result         1.  No acute cardiopulmonary disease.            Assessment/Plan  Elevated liver enzymes  Assessment & Plan  Monitor closely  If continue to get worse, will get ultrasound    Sepsis (HCC)  Assessment & Plan  This is Sepsis Present on admission  SIRS criteria identified on my evaluation include: Fever, with temperature greater than 101 deg F, Tachycardia, with heart rate greater than 90 BPM and Leukocytosis, with WBC greater than 12,000  Source is left arm cellulitis  Sepsis protocol initiated  Fluid resuscitation ordered per protocol  IV antibiotics as appropriate for source of sepsis  While organ dysfunction may be noted elsewhere in this problem list or in the chart, degree of organ dysfunction does not meet CMS criteria for severe sepsis          Left arm cellulitis  Assessment & Plan  Associated with sepsis  Ordered CT : cellulitis  Duplex usg: benign, no dvt  thrombophlebitis  Empiric antibiotics with IV vancomycin  Surgery following    Heroin abuse (HCC)- (present on admission)  Assessment & Plan  P.o. and IV opiates and lorazepam as needed for withdrawal symptoms  Cessation counseling       VTE prophylaxis: lovenox

## 2020-07-16 NOTE — PROGRESS NOTES
Bedside report received, bed locked and in low position. Call light within reach, no needs at this time.

## 2020-07-16 NOTE — CARE PLAN
Problem: Communication  Goal: The ability to communicate needs accurately and effectively will improve  Outcome: PROGRESSING AS EXPECTED   Patient communicates appropriately.  Problem: Safety  Goal: Will remain free from injury  Outcome: PROGRESSING AS EXPECTED  Goal: Will remain free from falls  Outcome: PROGRESSING AS EXPECTED   Fall precautions in place. Bed in lowest and locked position. Patient Calls for assistance

## 2020-07-16 NOTE — PROGRESS NOTES
Received report from day staff. Assumed pt care. Pain level 8/10, in back& abdomen. Pain management discussed. AOX4. POC discussed. Tele monitor in place. Call light within reach, bed in lowest position, and personal items accessible.

## 2020-07-16 NOTE — PROGRESS NOTES
"Pharmacy Kinetics 2020  25 y.o. male on vancomycin day # 2     Currently on Vancomycin 750 mg iv q8hr (12 mg/kg)  Provider specified end date: TBD    Indication for Treatment: Thrombophlebitis    Pertinent history per medical record: Admitted on 2020 for arm swelling. Patient with recent IVDU on left arm which is now swollen, painful, and erythematous. Initial concern for nec fasc was ruled out by CT which did not show obvious gas formation. AMS evaluated the patient and found him to have a cord a the site of infection concerning for thrombophlebitis. Vancomycin was thus initiated anticipating positive blood cultures.     Other antibiotics: none    Allergies: Patient has no known allergies.     List concerns for renal function: SIRS, Hepatitis, lactic acidosis    Pertinent cultures to date:   20 Blood x 2 - NGTD  7/15/20 Blood x 2 - NGTD    MRSA nares swab if pneumonia is a concern: n/a    Recent Labs     20  2334 20  0133   WBC 15.4* 7.5   NEUTSPOLYS 89.70* 80.20*     Recent Labs     20  2334 20  0521   BUN 14 6*   CREATININE 1.07 0.67   ALBUMIN 4.3 2.9*     No results for input(s): VANCOTROUGH, VANCOPEAK, VANCORANDOM in the last 72 hours.    Intake/Output Summary (Last 24 hours) at 2020 1534  Last data filed at 2020 0420  Gross per 24 hour   Intake --   Output 1200 ml   Net -1200 ml      /76   Pulse 89   Temp 37.1 °C (98.7 °F) (Temporal)   Resp 14   Ht 1.753 m (5' 9\")   Wt 57.7 kg (127 lb 3.3 oz)   SpO2 100%  Temp (24hrs), Av.4 °C (99.4 °F), Min:36.8 °C (98.2 °F), Max:37.9 °C (100.3 °F)      A/P   1. Vancomycin dose change: no change  2. Next vancomycin level:  @ 930  3. Goal trough: 10-15 mcg/mL (15-20 mcg/mL if blood turns positive with staph aureus)  4. Comments: Patient loaded with 1,500mg on arrival, then given a one time dose of 1,000mg due to late administration from initial load. Started on 750mg q8h which historically produced a trough " of 13 (although patient appears to have lost weight since then). Minimal concerns for accumulation. Will check level at steady state tomorrow. Anticipate discontinuation soon if blood cultures remain negative.     Clifford Wu, PharmD

## 2020-07-16 NOTE — ASSESSMENT & PLAN NOTE
Monitor closely  Trending down a little bit  Ultrasound abdomen showed Marked gallbladder wall thickening with trace pericholecystic fluid versus edematous gallbladder wall, appearance concerning for acalculous cholecystitis. Could be further evaluated with HIDA scan.  We will get HIDA scan

## 2020-07-16 NOTE — PROGRESS NOTES
"Pharmacy Kinetics 25 y.o. male on vancomycin day # 2 (restart) 2020    Received Vancomycin 1000 mg iv x1 (1600)     Provider specified end date: TBD    Indication for Treatment: SSTI, thrombophlebitis     Pertinent history per medical record: Admitted on 2020 for left arm cellulitis presenting with sepsis on admission. Pt has hx of IVDU. Previously on vanco/ Unasyn/clindamycin/doxycycline.     Other antibiotics: None    Allergies: Patient has no known allergies.     List concerns for renal function: abnormal LFTs, hypotension    Pertinent cultures to date:   7/15 - blood & urine cx - pending     MRSA nares swab if pneumonia is a concern (ordered/positive/negative/n-a): N/A    Recent Labs     20  2334   WBC 15.4*   NEUTSPOLYS 89.70*     Recent Labs     20  2334   BUN 14   CREATININE 1.07   ALBUMIN 4.3     No results for input(s): VANCOTROUGH, VANCOPEAK, VANCORANDOM in the last 72 hours.    Intake/Output Summary (Last 24 hours) at 2020 0200  Last data filed at 2020 0018  Gross per 24 hour   Intake 100 ml   Output 1500 ml   Net -1400 ml      Blood Pressure 105/69   Pulse 77   Temperature 37.5 °C (99.5 °F) (Temporal)   Respiration 16   Height 1.753 m (5' 9\")   Weight 57.7 kg (127 lb 3.3 oz)   Oxygen Saturation 94%  Temp (24hrs), Av.5 °C (99.5 °F), Min:36.9 °C (98.5 °F), Max:37.9 °C (100.3 °F)      A/P   1. Vancomycin maintenance dose: 750 mg iv q8h   2. Next vancomycin level: TBD by AM pharmacist - not ordered yet   3. Goal trough: 10-15 mcg/mL (15-20 mcg/mL for possible concern for bacteremia)   4. Comments: Renal function appears stable at this time. Pharmacy will continue to follow and recommend de-escalation of antibiotics as appropriate.       Mireya Aburto, PharmD      "

## 2020-07-17 LAB
ALBUMIN SERPL BCP-MCNC: 2.9 G/DL (ref 3.2–4.9)
ALBUMIN/GLOB SERPL: 1 G/DL
ALP SERPL-CCNC: 249 U/L (ref 30–99)
ALT SERPL-CCNC: 235 U/L (ref 2–50)
ANION GAP SERPL CALC-SCNC: 14 MMOL/L (ref 7–16)
AST SERPL-CCNC: 171 U/L (ref 12–45)
BASOPHILS # BLD AUTO: 0.7 % (ref 0–1.8)
BASOPHILS # BLD: 0.08 K/UL (ref 0–0.12)
BILIRUB SERPL-MCNC: 1 MG/DL (ref 0.1–1.5)
BUN SERPL-MCNC: 4 MG/DL (ref 8–22)
CALCIUM SERPL-MCNC: 8.4 MG/DL (ref 8.5–10.5)
CHLORIDE SERPL-SCNC: 105 MMOL/L (ref 96–112)
CO2 SERPL-SCNC: 21 MMOL/L (ref 20–33)
CREAT SERPL-MCNC: 0.58 MG/DL (ref 0.5–1.4)
EOSINOPHIL # BLD AUTO: 0.3 K/UL (ref 0–0.51)
EOSINOPHIL NFR BLD: 2.8 % (ref 0–6.9)
ERYTHROCYTE [DISTWIDTH] IN BLOOD BY AUTOMATED COUNT: 46.5 FL (ref 35.9–50)
GLOBULIN SER CALC-MCNC: 2.9 G/DL (ref 1.9–3.5)
GLUCOSE SERPL-MCNC: 90 MG/DL (ref 65–99)
HCT VFR BLD AUTO: 39.5 % (ref 42–52)
HGB BLD-MCNC: 13.7 G/DL (ref 14–18)
IMM GRANULOCYTES # BLD AUTO: 0.1 K/UL (ref 0–0.11)
IMM GRANULOCYTES NFR BLD AUTO: 0.9 % (ref 0–0.9)
LYMPHOCYTES # BLD AUTO: 0.74 K/UL (ref 1–4.8)
LYMPHOCYTES NFR BLD: 6.9 % (ref 22–41)
MCH RBC QN AUTO: 31.9 PG (ref 27–33)
MCHC RBC AUTO-ENTMCNC: 34.7 G/DL (ref 33.7–35.3)
MCV RBC AUTO: 92.1 FL (ref 81.4–97.8)
MONOCYTES # BLD AUTO: 0.86 K/UL (ref 0–0.85)
MONOCYTES NFR BLD AUTO: 8 % (ref 0–13.4)
NEUTROPHILS # BLD AUTO: 8.7 K/UL (ref 1.82–7.42)
NEUTROPHILS NFR BLD: 80.7 % (ref 44–72)
NRBC # BLD AUTO: 0 K/UL
NRBC BLD-RTO: 0 /100 WBC
PLATELET # BLD AUTO: 264 K/UL (ref 164–446)
PMV BLD AUTO: 11 FL (ref 9–12.9)
POTASSIUM SERPL-SCNC: 3.4 MMOL/L (ref 3.6–5.5)
PROT SERPL-MCNC: 5.8 G/DL (ref 6–8.2)
RBC # BLD AUTO: 4.29 M/UL (ref 4.7–6.1)
SODIUM SERPL-SCNC: 140 MMOL/L (ref 135–145)
VANCOMYCIN TROUGH SERPL-MCNC: 7 UG/ML (ref 10–20)
WBC # BLD AUTO: 10.8 K/UL (ref 4.8–10.8)

## 2020-07-17 PROCEDURE — A9270 NON-COVERED ITEM OR SERVICE: HCPCS | Performed by: INTERNAL MEDICINE

## 2020-07-17 PROCEDURE — 85025 COMPLETE CBC W/AUTO DIFF WBC: CPT

## 2020-07-17 PROCEDURE — 770020 HCHG ROOM/CARE - TELE (206)

## 2020-07-17 PROCEDURE — 700111 HCHG RX REV CODE 636 W/ 250 OVERRIDE (IP): Performed by: INTERNAL MEDICINE

## 2020-07-17 PROCEDURE — 700105 HCHG RX REV CODE 258: Performed by: INTERNAL MEDICINE

## 2020-07-17 PROCEDURE — 36415 COLL VENOUS BLD VENIPUNCTURE: CPT

## 2020-07-17 PROCEDURE — 80202 ASSAY OF VANCOMYCIN: CPT

## 2020-07-17 PROCEDURE — 80053 COMPREHEN METABOLIC PANEL: CPT

## 2020-07-17 PROCEDURE — 700102 HCHG RX REV CODE 250 W/ 637 OVERRIDE(OP): Performed by: INTERNAL MEDICINE

## 2020-07-17 PROCEDURE — 99232 SBSQ HOSP IP/OBS MODERATE 35: CPT | Performed by: INTERNAL MEDICINE

## 2020-07-17 RX ORDER — METHADONE HYDROCHLORIDE 10 MG/1
5 TABLET ORAL 2 TIMES DAILY
Status: DISCONTINUED | OUTPATIENT
Start: 2020-07-17 | End: 2020-07-18

## 2020-07-17 RX ADMIN — METHADONE HYDROCHLORIDE 5 MG: 10 TABLET ORAL at 05:40

## 2020-07-17 RX ADMIN — OXYCODONE 5 MG: 5 TABLET ORAL at 09:39

## 2020-07-17 RX ADMIN — ENOXAPARIN SODIUM 40 MG: 40 INJECTION SUBCUTANEOUS at 05:40

## 2020-07-17 RX ADMIN — METHADONE HYDROCHLORIDE 5 MG: 10 TABLET ORAL at 16:56

## 2020-07-17 RX ADMIN — PROCHLORPERAZINE EDISYLATE 10 MG: 5 INJECTION INTRAMUSCULAR; INTRAVENOUS at 06:32

## 2020-07-17 RX ADMIN — VANCOMYCIN HYDROCHLORIDE 750 MG: 500 INJECTION, POWDER, LYOPHILIZED, FOR SOLUTION INTRAVENOUS at 09:38

## 2020-07-17 RX ADMIN — ACETAMINOPHEN 650 MG: 325 TABLET, FILM COATED ORAL at 15:36

## 2020-07-17 RX ADMIN — VANCOMYCIN HYDROCHLORIDE 1000 MG: 500 INJECTION, POWDER, LYOPHILIZED, FOR SOLUTION INTRAVENOUS at 17:30

## 2020-07-17 RX ADMIN — SODIUM CHLORIDE: 9 INJECTION, SOLUTION INTRAVENOUS at 17:29

## 2020-07-17 RX ADMIN — VANCOMYCIN HYDROCHLORIDE 750 MG: 500 INJECTION, POWDER, LYOPHILIZED, FOR SOLUTION INTRAVENOUS at 01:24

## 2020-07-17 ASSESSMENT — FIBROSIS 4 INDEX: FIB4 SCORE: 1.14

## 2020-07-17 ASSESSMENT — ENCOUNTER SYMPTOMS
FOCAL WEAKNESS: 0
NAUSEA: 0
BACK PAIN: 0
FEVER: 0
EYE DISCHARGE: 0
HEADACHES: 0
ABDOMINAL PAIN: 0
SPUTUM PRODUCTION: 0
CHILLS: 0
DIARRHEA: 0
STRIDOR: 0
PALPITATIONS: 0
WEIGHT LOSS: 0
EYE REDNESS: 0
ORTHOPNEA: 0
MYALGIAS: 0
COUGH: 0
EYE PAIN: 0
SHORTNESS OF BREATH: 0
NECK PAIN: 0
VOMITING: 0

## 2020-07-17 NOTE — CARE PLAN
Problem: Communication  Goal: The ability to communicate needs accurately and effectively will improve  Outcome: PROGRESSING AS EXPECTED   Patient communicates appropriately, verbalizes needs.    Problem: Safety  Goal: Will remain free from injury  Outcome: PROGRESSING AS EXPECTED  Goal: Will remain free from falls  Outcome: PROGRESSING AS EXPECTED   Patient has remained free from falls. Fall precautions in place. Bed in lowest/locked position. Bed alarm on. Patient verbalizes understanding.    Problem: Infection  Goal: Will remain free from infection  Outcome: PROGRESSING AS EXPECTED

## 2020-07-17 NOTE — PROGRESS NOTES
Monitor room called to notify patient has been in/out accelerated junctional rhythm.  Updated NP [place], EKG ordered.

## 2020-07-17 NOTE — CARE PLAN
Problem: Knowledge Deficit  Goal: Knowledge of disease process/condition, treatment plan, diagnostic tests, and medications will improve  Note: Iv antibiotics, mobilize     Problem: Pain Management  Goal: Pain level will decrease to patient's comfort goal  Note: Prn meds per MAR

## 2020-07-17 NOTE — CARE PLAN
"  Problem: Nutritional:  Goal: Achieve adequate nutritional intake  Description: Patient will consume >50% of meals  Outcome: PROGRESSING AS EXPECTED     No PO intake recorded in flow sheets since admit. RN stated pt consumed 50-75% of breakfast this AM and pt was consuming >50% of meals. Pt reports \"eating and drinking good.\"  "

## 2020-07-17 NOTE — PROGRESS NOTES
Received report from day staff. Assumed pt care. Patient sleeping during bedside report. Tele monitor in place. Call light within reach, bed in lowest position, bed alarm on, and personal items accessible.

## 2020-07-17 NOTE — PROGRESS NOTES
"Pharmacy Kinetics 2020  25 y.o. male on vancomycin day # 3     Currently on Vancomycin 750 mg iv q8hr (12 mg/kg)  Provider specified end date: TBD     Indication for Treatment: Thrombophlebitis     Pertinent history per medical record: Admitted on 2020 for arm swelling. Patient with recent IVDU on left arm which is now swollen, painful, and erythematous. Initial concern for nec fasc was ruled out by CT which did not show obvious gas formation. AMS evaluated the patient and found him to have a cord a the site of infection concerning for thrombophlebitis. Vancomycin was thus initiated anticipating positive blood cultures.      Other antibiotics: none     Allergies: Patient has no known allergies.      List concerns for renal function: SIRS, Hepatitis, lactic acidosis     Pertinent cultures to date:   20 Blood x 2 - NGTD  7/15/20 Blood x 2 - NGTD     MRSA nares swab if pneumonia is a concern: n/a       Recent Labs     20  2334 20  0133   WBC 15.4* 7.5   NEUTSPOLYS 89.70* 80.20*     Recent Labs     20  2334 20  0521 20  0932   BUN 14 6* 4*   CREATININE 1.07 0.67 0.58   ALBUMIN 4.3 2.9* 2.9*     Recent Labs     20  0932   VANCOTROUGH 7.0*       Intake/Output Summary (Last 24 hours) at 2020 1123  Last data filed at 2020 0938  Gross per 24 hour   Intake 400 ml   Output 2075 ml   Net -1675 ml      /58   Pulse 64   Temp 37.6 °C (99.6 °F) (Temporal)   Resp 15   Ht 1.753 m (5' 9\")   Wt 58.1 kg (128 lb 1.4 oz)   SpO2 96%  Temp (24hrs), Av.8 °C (98.3 °F), Min:36.2 °C (97.2 °F), Max:37.6 °C (99.6 °F)      A/P   1. Vancomycin dose change: 1,000 mg IV q8h (17 mg/kg)  2. Next vancomycin level: 2 days  3. Goal trough: 10-15 mcg/mL  4. Comments: Level subtherapeutic, slight increase in dose given TID dosing. Level in roughly 2 days if patient remains on vancomycin. Anticipate transition off given lack of culture growth. Renal indices stable.     Clifford YU" Bryce, PharmD

## 2020-07-17 NOTE — PROGRESS NOTES
received in bed sleeping, aaox4, denies any discomfort, POC discussed, IV antibiotics, needs attended.

## 2020-07-17 NOTE — DISCHARGE PLANNING
Anticipated Discharge Disposition: home    Action: Patient discussed with Dr. Bennett. Patient currently receiving IV abx, will remain admitted for at least 1-2 days for treatment.     Barriers to Discharge: medical clearance    Plan: Case coordination available for discharge planning needs

## 2020-07-17 NOTE — PROGRESS NOTES
Lunch delivered and ate despite NPO orders, US made aware, NPO MN ordered for planned, US tomorrow.

## 2020-07-17 NOTE — PROGRESS NOTES
Hospital Medicine Daily Progress Note    Date of Service  7/17/2020    Chief Complaint  25 y.o. male admitted 7/14/2020 with cellulitis of left arm    Hospital Course    25 y.o. 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  Patient has been using his left arm for IV injection of heroin and methamphetamine on a regular basis      Interval Problem Update  Patient is sleeping comfortably on the bed.  No acute issue overnight.  Continue IV antibiotics  Consultants/Specialty  none    Code Status  full    Disposition  Remain on the floor    Review of Systems  Review of Systems   Constitutional: Negative for chills, fever and weight loss.   HENT: Negative for congestion and nosebleeds.    Eyes: Negative for pain, discharge and redness.   Respiratory: Negative for cough, sputum production, shortness of breath and stridor.    Cardiovascular: Negative for chest pain, palpitations and orthopnea.   Gastrointestinal: Negative for abdominal pain, diarrhea, nausea and vomiting.   Genitourinary: Negative for frequency and urgency.   Musculoskeletal: Negative for back pain, myalgias and neck pain.   Skin: Negative for itching and rash.   Neurological: Negative for focal weakness and headaches.        Physical Exam  Temp:  [36.2 °C (97.2 °F)-37.6 °C (99.6 °F)] 37.6 °C (99.6 °F)  Pulse:  [64-89] 64  Resp:  [14-16] 15  BP: (101-120)/(58-76) 101/58  SpO2:  [94 %-100 %] 96 %    Physical Exam  Vitals signs reviewed.   Constitutional:       General: He is not in acute distress.     Appearance: Normal appearance.   HENT:      Head: Normocephalic and atraumatic.      Nose: No congestion or rhinorrhea.   Eyes:      Extraocular Movements: Extraocular movements intact.      Pupils: Pupils are equal, round, and reactive to light.   Neck:      Musculoskeletal: Normal range of motion.    Cardiovascular:      Rate and Rhythm: Normal rate and regular rhythm.      Pulses: Normal pulses.   Pulmonary:      Effort: Pulmonary effort is normal. No respiratory distress.      Breath sounds: Normal breath sounds.   Abdominal:      General: Bowel sounds are normal. There is no distension.      Palpations: Abdomen is soft.      Tenderness: There is no abdominal tenderness.   Musculoskeletal:         General: No swelling.   Skin:     General: Skin is warm.      Comments: Left arm skin redness with marked border   Neurological:      General: No focal deficit present.      Mental Status: He is alert.         Fluids    Intake/Output Summary (Last 24 hours) at 7/17/2020 0838  Last data filed at 7/17/2020 0542  Gross per 24 hour   Intake --   Output 1575 ml   Net -1575 ml       Laboratory  Recent Labs     07/14/20  2334 07/16/20  0133   WBC 15.4* 7.5   RBC 4.40* 3.70*   HEMOGLOBIN 13.8* 11.5*   HEMATOCRIT 39.8* 35.5*   MCV 90.5 95.9   MCH 31.4 31.1   MCHC 34.7 32.4*   RDW 44.5 49.5   PLATELETCT 234 169   MPV 10.1 11.3     Recent Labs     07/14/20  2334 07/16/20  0521   SODIUM 132* 136   POTASSIUM 4.0 3.6   CHLORIDE 94* 105   CO2 23 20   GLUCOSE 137* 90   BUN 14 6*   CREATININE 1.07 0.67   CALCIUM 9.2 8.0*     Recent Labs     07/15/20  0517   INR 1.28*               Imaging  CT-EXTREMITY, UPPER WITH LEFT   Final Result         1.  Subcutaneous fat stranding in the medial, ulnar, and volar arm near the elbow. Appearance favors cellulitis. No focal fluid collection to suggest abscess is seen.      US-EXTREMITY VENOUS UPPER UNILAT LEFT   Final Result      DX-CHEST-PORTABLE (1 VIEW)   Final Result         1.  No acute cardiopulmonary disease.           Assessment/Plan  Elevated liver enzymes  Assessment & Plan  Monitor closely  Getting worse  We will get ultrasound abdomen      Sepsis (HCC)  Assessment & Plan  This is Sepsis Present on admission  SIRS criteria identified on my evaluation include: Fever, with temperature  greater than 101 deg F, Tachycardia, with heart rate greater than 90 BPM and Leukocytosis, with WBC greater than 12,000  Source is left arm cellulitis  Sepsis protocol initiated  Fluid resuscitation ordered per protocol  IV antibiotics as appropriate for source of sepsis  While organ dysfunction may be noted elsewhere in this problem list or in the chart, degree of organ dysfunction does not meet CMS criteria for severe sepsis          Left arm cellulitis  Assessment & Plan  Associated with sepsis  Ordered CT : cellulitis  Duplex usg: benign, no dvt  thrombophlebitis  Empiric antibiotics with IV vancomycin  Improving      Heroin abuse (HCC)- (present on admission)  Assessment & Plan  P.o. and IV opiates and lorazepam as needed for withdrawal symptoms  Cessation counseling       VTE prophylaxis: lovenox

## 2020-07-17 NOTE — PROGRESS NOTES
Monitor summary. .20/.08/.38 Sinus Rhythm 77-87, in and out of accelerated junctional, w/ rare PAC, 1.2-1.4 sp.

## 2020-07-18 ENCOUNTER — APPOINTMENT (OUTPATIENT)
Dept: RADIOLOGY | Facility: MEDICAL CENTER | Age: 25
DRG: 872 | End: 2020-07-18
Attending: INTERNAL MEDICINE
Payer: MEDICARE

## 2020-07-18 LAB
ALBUMIN SERPL BCP-MCNC: 3.1 G/DL (ref 3.2–4.9)
ALBUMIN/GLOB SERPL: 1.2 G/DL
ALP SERPL-CCNC: 226 U/L (ref 30–99)
ALT SERPL-CCNC: 195 U/L (ref 2–50)
ANION GAP SERPL CALC-SCNC: 14 MMOL/L (ref 7–16)
AST SERPL-CCNC: 86 U/L (ref 12–45)
BASOPHILS # BLD AUTO: 0.5 % (ref 0–1.8)
BASOPHILS # BLD: 0.06 K/UL (ref 0–0.12)
BILIRUB SERPL-MCNC: 0.6 MG/DL (ref 0.1–1.5)
BUN SERPL-MCNC: 6 MG/DL (ref 8–22)
CALCIUM SERPL-MCNC: 8.3 MG/DL (ref 8.5–10.5)
CHLORIDE SERPL-SCNC: 106 MMOL/L (ref 96–112)
CO2 SERPL-SCNC: 19 MMOL/L (ref 20–33)
CREAT SERPL-MCNC: 0.6 MG/DL (ref 0.5–1.4)
EOSINOPHIL # BLD AUTO: 0.36 K/UL (ref 0–0.51)
EOSINOPHIL NFR BLD: 2.8 % (ref 0–6.9)
ERYTHROCYTE [DISTWIDTH] IN BLOOD BY AUTOMATED COUNT: 45.2 FL (ref 35.9–50)
GLOBULIN SER CALC-MCNC: 2.5 G/DL (ref 1.9–3.5)
GLUCOSE SERPL-MCNC: 98 MG/DL (ref 65–99)
HCT VFR BLD AUTO: 35.9 % (ref 42–52)
HGB BLD-MCNC: 12.4 G/DL (ref 14–18)
IMM GRANULOCYTES # BLD AUTO: 0.19 K/UL (ref 0–0.11)
IMM GRANULOCYTES NFR BLD AUTO: 1.5 % (ref 0–0.9)
LYMPHOCYTES # BLD AUTO: 0.92 K/UL (ref 1–4.8)
LYMPHOCYTES NFR BLD: 7.2 % (ref 22–41)
MCH RBC QN AUTO: 31 PG (ref 27–33)
MCHC RBC AUTO-ENTMCNC: 34.5 G/DL (ref 33.7–35.3)
MCV RBC AUTO: 89.8 FL (ref 81.4–97.8)
MONOCYTES # BLD AUTO: 1.32 K/UL (ref 0–0.85)
MONOCYTES NFR BLD AUTO: 10.4 % (ref 0–13.4)
NEUTROPHILS # BLD AUTO: 9.9 K/UL (ref 1.82–7.42)
NEUTROPHILS NFR BLD: 77.6 % (ref 44–72)
NRBC # BLD AUTO: 0 K/UL
NRBC BLD-RTO: 0 /100 WBC
PLATELET # BLD AUTO: 306 K/UL (ref 164–446)
PMV BLD AUTO: 10.2 FL (ref 9–12.9)
POTASSIUM SERPL-SCNC: 3.4 MMOL/L (ref 3.6–5.5)
PROT SERPL-MCNC: 5.6 G/DL (ref 6–8.2)
RBC # BLD AUTO: 4 M/UL (ref 4.7–6.1)
SODIUM SERPL-SCNC: 139 MMOL/L (ref 135–145)
WBC # BLD AUTO: 12.8 K/UL (ref 4.8–10.8)

## 2020-07-18 PROCEDURE — 700111 HCHG RX REV CODE 636 W/ 250 OVERRIDE (IP): Performed by: INTERNAL MEDICINE

## 2020-07-18 PROCEDURE — 99232 SBSQ HOSP IP/OBS MODERATE 35: CPT | Performed by: INTERNAL MEDICINE

## 2020-07-18 PROCEDURE — A9270 NON-COVERED ITEM OR SERVICE: HCPCS | Performed by: INTERNAL MEDICINE

## 2020-07-18 PROCEDURE — 770020 HCHG ROOM/CARE - TELE (206)

## 2020-07-18 PROCEDURE — 80053 COMPREHEN METABOLIC PANEL: CPT

## 2020-07-18 PROCEDURE — 700102 HCHG RX REV CODE 250 W/ 637 OVERRIDE(OP): Performed by: INTERNAL MEDICINE

## 2020-07-18 PROCEDURE — 700105 HCHG RX REV CODE 258: Performed by: INTERNAL MEDICINE

## 2020-07-18 PROCEDURE — 85025 COMPLETE CBC W/AUTO DIFF WBC: CPT

## 2020-07-18 PROCEDURE — 76705 ECHO EXAM OF ABDOMEN: CPT

## 2020-07-18 PROCEDURE — 36415 COLL VENOUS BLD VENIPUNCTURE: CPT

## 2020-07-18 RX ORDER — METHADONE HYDROCHLORIDE 5 MG/1
2.5 TABLET ORAL 2 TIMES DAILY
Status: DISCONTINUED | OUTPATIENT
Start: 2020-07-18 | End: 2020-07-19 | Stop reason: HOSPADM

## 2020-07-18 RX ORDER — LORAZEPAM 1 MG/1
0.25 TABLET ORAL EVERY 8 HOURS PRN
Status: DISCONTINUED | OUTPATIENT
Start: 2020-07-18 | End: 2020-07-19 | Stop reason: HOSPADM

## 2020-07-18 RX ADMIN — SODIUM CHLORIDE: 9 INJECTION, SOLUTION INTRAVENOUS at 09:21

## 2020-07-18 RX ADMIN — ENOXAPARIN SODIUM 40 MG: 40 INJECTION SUBCUTANEOUS at 05:00

## 2020-07-18 RX ADMIN — ACETAMINOPHEN 650 MG: 325 TABLET, FILM COATED ORAL at 18:24

## 2020-07-18 RX ADMIN — VANCOMYCIN HYDROCHLORIDE 1000 MG: 500 INJECTION, POWDER, LYOPHILIZED, FOR SOLUTION INTRAVENOUS at 18:24

## 2020-07-18 RX ADMIN — LORAZEPAM 0.25 MG: 1 TABLET ORAL at 11:29

## 2020-07-18 RX ADMIN — METHADONE HYDROCHLORIDE 2.5 MG: 5 TABLET ORAL at 18:23

## 2020-07-18 RX ADMIN — METHADONE HYDROCHLORIDE 5 MG: 10 TABLET ORAL at 05:00

## 2020-07-18 RX ADMIN — LORAZEPAM 0.25 MG: 1 TABLET ORAL at 19:50

## 2020-07-18 RX ADMIN — ACETAMINOPHEN 650 MG: 325 TABLET, FILM COATED ORAL at 11:29

## 2020-07-18 RX ADMIN — VANCOMYCIN HYDROCHLORIDE 1000 MG: 500 INJECTION, POWDER, LYOPHILIZED, FOR SOLUTION INTRAVENOUS at 01:39

## 2020-07-18 RX ADMIN — VANCOMYCIN HYDROCHLORIDE 1000 MG: 500 INJECTION, POWDER, LYOPHILIZED, FOR SOLUTION INTRAVENOUS at 09:16

## 2020-07-18 ASSESSMENT — ENCOUNTER SYMPTOMS
SPUTUM PRODUCTION: 0
CHILLS: 0
NECK PAIN: 0
EYE DISCHARGE: 0
FOCAL WEAKNESS: 0
ABDOMINAL PAIN: 0
COUGH: 0
BACK PAIN: 0
ORTHOPNEA: 0
EYE PAIN: 0
HEADACHES: 0
VOMITING: 0
SHORTNESS OF BREATH: 0
WEIGHT LOSS: 0
DIARRHEA: 0
PALPITATIONS: 0
STRIDOR: 0
MYALGIAS: 0

## 2020-07-18 ASSESSMENT — FIBROSIS 4 INDEX: FIB4 SCORE: 0.54

## 2020-07-18 NOTE — PROGRESS NOTES
Monitor summary. .20/.08/.44 Sinus Moe-Sinus Rhythm 51-67, in and out of junctional, rare PAC, rare PVC, 1.4 second pause overnight

## 2020-07-18 NOTE — PROGRESS NOTES
Received report from day staff. Assumed pt care. Pain level 8/10, pain management discussed. AOX4. POC discussed. Tele monitor in place. Call light within reach, bed in lowest position, and personal items accessible.

## 2020-07-18 NOTE — CARE PLAN
Problem: Communication  Goal: The ability to communicate needs accurately and effectively will improve  Outcome: PROGRESSING AS EXPECTED  Patient communicates appropriately, verbalizes needs.     Problem: Safety  Goal: Will remain free from injury  Outcome: PROGRESSING AS EXPECTED  Goal: Will remain free from falls  Outcome: PROGRESSING AS EXPECTED  Fall precautions in place. Patient verbalizes understanding of fall risk education. Patient calls for assistance.      Problem: Bowel/Gastric:  Goal: Normal bowel function is maintained or improved  Outcome: PROGRESSING AS EXPECTED  Patient had normal BM today 7/17  Goal: Will not experience complications related to bowel motility  Outcome: PROGRESSING AS EXPECTED

## 2020-07-18 NOTE — PROGRESS NOTES
[facility] Medicine Daily Progress Note    Date of Service  7/18/2020    Chief Complaint  25 y.o. male admitted 7/14/2020 with cellulitis of left arm    Hospital Course    25 y.o. 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  Patient has been using his left arm for IV injection of heroin and methamphetamine on a regular basis      Interval Problem Update  Patient is sleeping comfortably on the bed.  No acute issue overnight.  Continue IV antibiotics and his cellulitis is improving.  Decrease methadone dose to 2.5 mg twice daily.  Consultants/Specialty  none    Code Status  full    Disposition  Remain on the floor    Review of Systems  Review of Systems   Constitutional: Negative for chills and weight loss.   HENT: Negative for congestion and nosebleeds.    Eyes: Negative for pain and discharge.   Respiratory: Negative for cough, sputum production, shortness of breath and stridor.    Cardiovascular: Negative for palpitations and orthopnea.   Gastrointestinal: Negative for abdominal pain, diarrhea and vomiting.   Genitourinary: Negative for frequency and urgency.   Musculoskeletal: Negative for back pain, myalgias and neck pain.   Skin: Negative for itching and rash.   Neurological: Negative for focal weakness and headaches.        Physical Exam  Temp:  [36.1 °C (97 °F)-37.8 °C (100.1 °F)] 36.1 °C (97 °F)  Pulse:  [61-81] 74  Resp:  [14-16] 16  BP: (100-121)/(53-72) 107/61  SpO2:  [96 %-99 %] 96 %    Physical Exam  Vitals signs reviewed.   Constitutional:       General: He is not in acute distress.     Appearance: Normal appearance.   HENT:      Head: Normocephalic and atraumatic.      Nose: No congestion.   Eyes:      Extraocular Movements: Extraocular movements intact.      Pupils: Pupils are equal, round, and reactive to light.   Neck:       Musculoskeletal: Normal range of motion.   Cardiovascular:      Rate and Rhythm: Normal rate and regular rhythm.      Pulses: Normal pulses.   Pulmonary:      Effort: Pulmonary effort is normal. No respiratory distress.   Abdominal:      General: Bowel sounds are normal.      Palpations: Abdomen is soft.   Musculoskeletal:         General: No swelling.   Skin:     General: Skin is warm.      Comments: Left arm skin redness with marked border   Neurological:      General: No focal deficit present.      Mental Status: He is alert.         Fluids    Intake/Output Summary (Last 24 hours) at 7/18/2020 0801  Last data filed at 7/18/2020 0442  Gross per 24 hour   Intake 400 ml   Output 1100 ml   Net -700 ml       Laboratory  Recent Labs     07/16/20  0133 07/17/20  0932 07/18/20  0257   WBC 7.5 10.8 12.8*   RBC 3.70* 4.29* 4.00*   HEMOGLOBIN 11.5* 13.7* 12.4*   HEMATOCRIT 35.5* 39.5* 35.9*   MCV 95.9 92.1 89.8   MCH 31.1 31.9 31.0   MCHC 32.4* 34.7 34.5   RDW 49.5 46.5 45.2   PLATELETCT 169 264 306   MPV 11.3 11.0 10.2     Recent Labs     07/16/20  0521 07/17/20  0932 07/18/20  0257   SODIUM 136 140 139   POTASSIUM 3.6 3.4* 3.4*   CHLORIDE 105 105 106   CO2 20 21 19*   GLUCOSE 90 90 98   BUN 6* 4* 6*   CREATININE 0.67 0.58 0.60   CALCIUM 8.0* 8.4* 8.3*                   Imaging  US-RUQ   Final Result         1.  Marked gallbladder wall thickening with trace pericholecystic fluid versus edematous gallbladder wall, appearance concerning for acalculous cholecystitis. Could be further evaluated with HIDA scan.   2.  Borderline hepatomegaly   3.  Right pleural effusion      CT-EXTREMITY, UPPER WITH LEFT   Final Result         1.  Subcutaneous fat stranding in the medial, ulnar, and volar arm near the elbow. Appearance favors cellulitis. No focal fluid collection to suggest abscess is seen.      US-EXTREMITY VENOUS UPPER UNILAT LEFT   Final Result      DX-CHEST-PORTABLE (1 VIEW)   Final Result         1.  No acute  cardiopulmonary disease.           Assessment/Plan  Elevated liver enzymes  Assessment & Plan  Monitor closely  Trending down a little bit  Ultrasound abdomen showed Marked gallbladder wall thickening with trace pericholecystic fluid versus edematous gallbladder wall, appearance concerning for acalculous cholecystitis. Could be further evaluated with HIDA scan.  We will get HIDA scan        Sepsis (HCC)  Assessment & Plan  This is Sepsis Present on admission  SIRS criteria identified on my evaluation include: Fever, with temperature greater than 101 deg F, Tachycardia, with heart rate greater than 90 BPM and Leukocytosis, with WBC greater than 12,000  Source is left arm cellulitis  Sepsis protocol initiated  Fluid resuscitation ordered per protocol  IV antibiotics as appropriate for source of sepsis  While organ dysfunction may be noted elsewhere in this problem list or in the chart, degree of organ dysfunction does not meet CMS criteria for severe sepsis          Left arm cellulitis  Assessment & Plan  Associated with sepsis  Ordered CT : cellulitis  Duplex usg: benign, no dvt  thrombophlebitis  Empiric antibiotics with IV vancomycin  Improving  Blood culture negative to date      Heroin abuse (HCC)- (present on admission)  Assessment & Plan  P.o. and IV opiates and lorazepam as needed for withdrawal symptoms  Cessation counseling       VTE prophylaxis: lovenox

## 2020-07-18 NOTE — PROGRESS NOTES
"Pharmacy Kinetics 2020  25 y.o. male on vancomycin day # 4     Currently on Vancomycin 1000 mg iv q8hr   Provider specified end date: TBD     Indication for Treatment: Thrombophlebitis     Pertinent history per medical record: Admitted on 2020 for arm swelling. Patient with recent IVDU on left arm which is now swollen, painful, and erythematous. Initial concern for nec fasc was ruled out by CT which did not show obvious gas formation. AMS evaluated the patient and found him to have a cord a the site of infection concerning for thrombophlebitis. Vancomycin was thus initiated anticipating positive blood cultures.      Other antibiotics: none     Allergies: Patient has no known allergies.      List concerns for renal function: SIRS, Hepatitis, lactic acidosis     Pertinent cultures to date:   20 Blood x 2 - NGTD  7/15/20 Blood x 2 - NGTD     MRSA nares swab if pneumonia is a concern: n/a       Recent Labs     20  0133 20  0932 20  0257   WBC 7.5 10.8 12.8*   NEUTSPOLYS 80.20* 80.70* 77.60*     Recent Labs     20  0521 20  0932 20  0257   BUN 6* 4* 6*   CREATININE 0.67 0.58 0.60   ALBUMIN 2.9* 2.9* 3.1*     Recent Labs     20  0932   VANCOTROUGH 7.0*       Intake/Output Summary (Last 24 hours) at 2020 1400  Last data filed at 2020 1200  Gross per 24 hour   Intake 1105 ml   Output 2000 ml   Net -895 ml      /56   Pulse 70   Temp 37.7 °C (99.8 °F) (Temporal)   Resp 15   Ht 1.753 m (5' 9\")   Wt 58 kg (127 lb 13.9 oz)   SpO2 97%  Temp (24hrs), Av.1 °C (98.8 °F), Min:36.1 °C (97 °F), Max:37.8 °C (100.1 °F)      A/P   1. Vancomycin dose change: No change  2. Next vancomycin level: No level - anticipate discontinuation tomorrow  3. Goal trough: 10-15 mcg/mL  4. Comments: Patient tolerating IV vancomycin, infection improving. Cultures remain without growth. Transition to PO abx tomorrow most likely. Renal indices stable. If vancomycin continues " past tomorrow consider level to assess safety and efficacy of dose.     Clifford Wu, PharmD

## 2020-07-19 VITALS
TEMPERATURE: 98.8 F | HEART RATE: 47 BPM | OXYGEN SATURATION: 97 % | WEIGHT: 126.98 LBS | DIASTOLIC BLOOD PRESSURE: 55 MMHG | BODY MASS INDEX: 18.81 KG/M2 | HEIGHT: 69 IN | RESPIRATION RATE: 16 BRPM | SYSTOLIC BLOOD PRESSURE: 113 MMHG

## 2020-07-19 LAB
ALBUMIN SERPL BCP-MCNC: 3.1 G/DL (ref 3.2–4.9)
ALBUMIN/GLOB SERPL: 1.2 G/DL
ALP SERPL-CCNC: 195 U/L (ref 30–99)
ALT SERPL-CCNC: 144 U/L (ref 2–50)
ANION GAP SERPL CALC-SCNC: 13 MMOL/L (ref 7–16)
AST SERPL-CCNC: 44 U/L (ref 12–45)
BASOPHILS # BLD AUTO: 0.8 % (ref 0–1.8)
BASOPHILS # BLD: 0.08 K/UL (ref 0–0.12)
BILIRUB SERPL-MCNC: 0.2 MG/DL (ref 0.1–1.5)
BUN SERPL-MCNC: 5 MG/DL (ref 8–22)
CALCIUM SERPL-MCNC: 8.4 MG/DL (ref 8.5–10.5)
CHLORIDE SERPL-SCNC: 107 MMOL/L (ref 96–112)
CO2 SERPL-SCNC: 19 MMOL/L (ref 20–33)
CREAT SERPL-MCNC: 0.53 MG/DL (ref 0.5–1.4)
EOSINOPHIL # BLD AUTO: 0.56 K/UL (ref 0–0.51)
EOSINOPHIL NFR BLD: 5.4 % (ref 0–6.9)
ERYTHROCYTE [DISTWIDTH] IN BLOOD BY AUTOMATED COUNT: 49.2 FL (ref 35.9–50)
GLOBULIN SER CALC-MCNC: 2.5 G/DL (ref 1.9–3.5)
GLUCOSE SERPL-MCNC: 120 MG/DL (ref 65–99)
HCT VFR BLD AUTO: 37.5 % (ref 42–52)
HGB BLD-MCNC: 12.4 G/DL (ref 14–18)
IMM GRANULOCYTES # BLD AUTO: 0.43 K/UL (ref 0–0.11)
IMM GRANULOCYTES NFR BLD AUTO: 4.2 % (ref 0–0.9)
LYMPHOCYTES # BLD AUTO: 1.22 K/UL (ref 1–4.8)
LYMPHOCYTES NFR BLD: 11.8 % (ref 22–41)
MCH RBC QN AUTO: 31.3 PG (ref 27–33)
MCHC RBC AUTO-ENTMCNC: 33.1 G/DL (ref 33.7–35.3)
MCV RBC AUTO: 94.7 FL (ref 81.4–97.8)
MONOCYTES # BLD AUTO: 1.18 K/UL (ref 0–0.85)
MONOCYTES NFR BLD AUTO: 11.5 % (ref 0–13.4)
NEUTROPHILS # BLD AUTO: 6.83 K/UL (ref 1.82–7.42)
NEUTROPHILS NFR BLD: 66.3 % (ref 44–72)
NRBC # BLD AUTO: 0 K/UL
NRBC BLD-RTO: 0 /100 WBC
PLATELET # BLD AUTO: 327 K/UL (ref 164–446)
PMV BLD AUTO: 10 FL (ref 9–12.9)
POTASSIUM SERPL-SCNC: 3.6 MMOL/L (ref 3.6–5.5)
PROT SERPL-MCNC: 5.6 G/DL (ref 6–8.2)
RBC # BLD AUTO: 3.96 M/UL (ref 4.7–6.1)
SODIUM SERPL-SCNC: 139 MMOL/L (ref 135–145)
WBC # BLD AUTO: 10.3 K/UL (ref 4.8–10.8)

## 2020-07-19 PROCEDURE — 36415 COLL VENOUS BLD VENIPUNCTURE: CPT

## 2020-07-19 PROCEDURE — 85025 COMPLETE CBC W/AUTO DIFF WBC: CPT

## 2020-07-19 PROCEDURE — 700102 HCHG RX REV CODE 250 W/ 637 OVERRIDE(OP): Performed by: INTERNAL MEDICINE

## 2020-07-19 PROCEDURE — 700105 HCHG RX REV CODE 258: Performed by: INTERNAL MEDICINE

## 2020-07-19 PROCEDURE — 99239 HOSP IP/OBS DSCHRG MGMT >30: CPT | Performed by: INTERNAL MEDICINE

## 2020-07-19 PROCEDURE — 80053 COMPREHEN METABOLIC PANEL: CPT

## 2020-07-19 PROCEDURE — A9270 NON-COVERED ITEM OR SERVICE: HCPCS | Performed by: INTERNAL MEDICINE

## 2020-07-19 PROCEDURE — 700111 HCHG RX REV CODE 636 W/ 250 OVERRIDE (IP): Performed by: INTERNAL MEDICINE

## 2020-07-19 RX ORDER — DOXYCYCLINE 100 MG/1
100 CAPSULE ORAL 2 TIMES DAILY
Qty: 10 CAP | Refills: 0 | Status: SHIPPED | OUTPATIENT
Start: 2020-07-19 | End: 2020-07-24

## 2020-07-19 RX ORDER — AMOXICILLIN AND CLAVULANATE POTASSIUM 875; 125 MG/1; MG/1
1 TABLET, FILM COATED ORAL 2 TIMES DAILY
Qty: 10 TAB | Refills: 0 | Status: SHIPPED | OUTPATIENT
Start: 2020-07-19 | End: 2020-07-24

## 2020-07-19 RX ADMIN — VANCOMYCIN HYDROCHLORIDE 1000 MG: 500 INJECTION, POWDER, LYOPHILIZED, FOR SOLUTION INTRAVENOUS at 01:39

## 2020-07-19 RX ADMIN — SODIUM CHLORIDE: 9 INJECTION, SOLUTION INTRAVENOUS at 01:39

## 2020-07-19 RX ADMIN — ENOXAPARIN SODIUM 40 MG: 40 INJECTION SUBCUTANEOUS at 04:44

## 2020-07-19 RX ADMIN — METHADONE HYDROCHLORIDE 2.5 MG: 5 TABLET ORAL at 04:44

## 2020-07-19 RX ADMIN — LORAZEPAM 0.25 MG: 1 TABLET ORAL at 10:11

## 2020-07-19 ASSESSMENT — ENCOUNTER SYMPTOMS
WEIGHT LOSS: 0
EYE PAIN: 0
PALPITATIONS: 0
NECK PAIN: 0
COUGH: 0
STRIDOR: 0
ABDOMINAL PAIN: 0
VOMITING: 0
SPUTUM PRODUCTION: 0
EYE DISCHARGE: 0
ORTHOPNEA: 0
SHORTNESS OF BREATH: 0
DIARRHEA: 0
FOCAL WEAKNESS: 0
CHILLS: 0
BACK PAIN: 0
MYALGIAS: 0
HEADACHES: 0

## 2020-07-19 NOTE — DISCHARGE INSTRUCTIONS
Discharge Instructions    Discharged to home by car with friend. Discharged via wheelchair, hospital escort: Yes.  Special equipment needed: Not Applicable    Be sure to schedule a follow-up appointment with your primary care doctor or any specialists as instructed.     Discharge Plan:   Diet Plan: Discussed  Activity Level: Discussed  Confirmed Follow up Appointment: Patient to Call and Schedule Appointment  Confirmed Symptoms Management: Discussed  Medication Reconciliation Updated: Yes    I understand that a diet low in cholesterol, fat, and sodium is recommended for good health. Unless I have been given specific instructions below for another diet, I accept this instruction as my diet prescription.   Other diet: N/A    Special Instructions: None    · Is patient discharged on Warfarin / Coumadin?   No     Depression / Suicide Risk    As you are discharged from this [facility] facility, it is important to learn how to keep safe from harming yourself.    Recognize the warning signs:  · Abrupt changes in personality, positive or negative- including increase in energy   · Giving away possessions  · Change in eating patterns- significant weight changes-  positive or negative  · Change in sleeping patterns- unable to sleep or sleeping all the time   · Unwillingness or inability to communicate  · Depression  · Unusual sadness, discouragement and loneliness  · Talk of wanting to die  · Neglect of personal appearance   · Rebelliousness- reckless behavior  · Withdrawal from people/activities they love  · Confusion- inability to concentrate     If you or a loved one observes any of these behaviors or has concerns about self-harm, here's what you can do:  · Talk about it- your feelings and reasons for harming yourself  · Remove any means that you might use to hurt yourself (examples: pills, rope, extension cords, firearm)  · Get professional help from the community (Mental Health, Substance Abuse, psychological  counseling)  · Do not be alone:Call your Safe Contact- someone whom you trust who will be there for you.  · Call your local CRISIS HOTLINE 632-7816 or 293-334-1183  · Call your local Children's Mobile Crisis Response Team [place] (438) 024-1507 or www.[employer]  · Call the toll free National Suicide Prevention Hotlines   · National Suicide Prevention Lifeline 301-870-HDJQ (2464)  · Around [facility] Hope Line Network 800-SUICIDE (896-6734)      Cellulitis, Adult    Cellulitis is a skin infection. The infected area is often warm, red, swollen, and sore. It occurs most often in the arms and lower legs. It is very important to get treated for this condition.  What are the causes?  This condition is caused by bacteria. The bacteria enter through a break in the skin, such as a cut, burn, insect bite, open sore, or crack.  What increases the risk?  This condition is more likely to occur in people who:  · Have a weak body defense system (immune system).  · Have open cuts, burns, bites, or scrapes on the skin.  · Are older than 60 years of age.  · Have a blood sugar problem (diabetes).  · Have a long-lasting (chronic) liver disease (cirrhosis) or kidney disease.  · Are very overweight (obese).  · Have a skin problem, such as:  ? Itchy rash (eczema).  ? Slow movement of blood in the veins (venous stasis).  ? Fluid buildup below the skin (edema).  · Have been treated with high-energy rays (radiation).  · Use IV drugs.  What are the signs or symptoms?  Symptoms of this condition include:  · Skin that is:  ? Red.  ? Streaking.  ? Spotting.  ? Swollen.  ? Sore or painful when you touch it.  ? Warm.  · A fever.  · Chills.  · Blisters.  How is this diagnosed?  This condition is diagnosed based on:  · Medical history.  · Physical exam.  · Blood tests.  · Imaging tests.  How is this treated?  Treatment for this condition may include:  · Medicines to treat infections or allergies.  · Home care, such as:  ? Rest.  ? Placing cold  or warm cloths (compresses) on the skin.  · Hospital care, if the condition is very bad.  Follow these instructions at home:  Medicines  · Take over-the-counter and prescription medicines only as told by your doctor.  · If you were prescribed an antibiotic medicine, take it as told by your doctor. Do not stop taking it even if you start to feel better.  General instructions    · Drink enough fluid to keep your pee (urine) pale yellow.  · Do not touch or rub the infected area.  · Raise (elevate) the infected area above the level of your heart while you are sitting or lying down.  · Place cold or warm cloths on the area as told by your doctor.  · Keep all follow-up visits as told by your doctor. This is important.  Contact a doctor if:  · You have a fever.  · You do not start to get better after 1-2 days of treatment.  · Your bone or joint under the infected area starts to hurt after the skin has healed.  · Your infection comes back. This can happen in the same area or another area.  · You have a swollen bump in the area.  · You have new symptoms.  · You feel ill and have muscle aches and pains.  Get help right away if:  · Your symptoms get worse.  · You feel very sleepy.  · You throw up (vomit) or have watery poop (diarrhea) for a long time.  · You see red streaks coming from the area.  · Your red area gets larger.  · Your red area turns dark in color.  These symptoms may represent a serious problem that is an emergency. Do not wait to see if the symptoms will go away. Get medical help right away. Call your local emergency services (911 in the [place] Do not drive yourself to the hospital.  Summary  · Cellulitis is a skin infection. The area is often warm, red, swollen, and sore.  · This condition is treated with medicines, rest, and cold and warm cloths.  · Take all medicines only as told by your doctor.  · Tell your doctor if symptoms do not start to get better after 1-2 days of treatment.  This information is not  intended to replace advice given to you by your health care provider. Make sure you discuss any questions you have with your health care provider.  Document Released: 06/05/2009 Document Revised: 05/09/2019 Document Reviewed: 05/09/2019  Elsevier Patient Education © 2020 Elsevier Inc.    Finding Treatment for Addiction  Addiction is a complex disease of the brain that causes an uncontrollable (compulsive) need for:  · A substance. This includes alcohol, illegal drugs, or prescription medicines, such as painkillers.  · An activity or behavior, such as gambling or shopping.  Addiction changes the way your brain works. Because of this change:  · The need for the medicine, drug, or activity can become so strong that you think about it all the time.  · Getting more and more of your addiction becomes the most important thing to you.  · You may find yourself leaving other activities and relationships to pursue your addiction.  · You can become physically dependent on a substance.  · Your health, behavior, emotions, and relationships can change for the worse.  How do I know if I need treatment for addiction?  Addiction is a progressive disease. Without treatment, addiction can get worse. Living with addiction puts you at higher risk for injury, poor health, loss of employment, loss of money, and even death.  You might need treatment for addiction if:  · You have tried to stop or cut down, but you have not succeeded.  · You find it annoying that your friends and family are concerned about your use or behavior.  · You feel guilty about your use or behavior.  · You need a particular substance or activity to start your day or to calm down.  · You are running out of money because of your addiction.  · You have done something illegal to support your addiction.  · Your addiction has caused you:  ? Health problems.  ? Trouble in school, work, home, or with the police.  ? To devote all your time to your addiction, and not to other  responsibilities.  ? To tell lies in order to hide your problem.  What types of treatment are available?  There may be options for treatment programs and plans based on your addiction, condition, needs, and preferences. No single treatment is right for everyone.  · Treatment programs can be:  ? Outpatient. You live at home and go to work or school, but you go to a clinic for treatment.  ? Inpatient. You live and sleep at the program facility during treatment.  · Programs may include:  ? Medicine. You may need medicine to treat the addiction itself, or to treat anxiety or depression.  ? Counseling and behavior therapy. This can help individuals and families behave in healthier ways and relate more effectively.  ? Support groups. Confidential group therapy, such as a 12-step program, can help individuals and families during treatment and recovery.  ? A combination of education, counseling, and a 12-step, spirituality-based approach.  What should I consider when selecting a treatment program?  Think about your individual requirements when selecting a treatment program. Ask about:  · The overall approach to treatment.  ? Some programs are strictly 12-step programs. Some have a more flexible approach.  ? Programs may differ in length of stay, setting, and size.  ? Some programs include your family in your treatment plan. Support may be offered to them throughout the treatment process, as well as instructions for them when you are discharged.  ? You may continue to receive support after you have left the program.  · The types of medical services that are offered. Find out if the program:  ? Offers specific treatment for your particular addiction.  ? Meets all of your needs, including physical and cultural needs.  ? Includes any medicines you might need.  ? Offers mental health counseling as part of your treatment.  ? Offers the 12-step meetings at the center, or if transport is available for patients to attend meetings at  "other locations.  · The cost and types of insurance that are accepted.  ? Some programs are sponsored by the government. They support patients who do not have private insurance.  ? If you do not have insurance, or if you choose to attend a program that does not accept your insurance, call the treatment center. Tell them your financial needs and whether a payment plan can be set up.  ? There are also organizations that will help you find the resources for treatment. You can find them online by searching \"treatment for addiction.\"  · If the program is certified by the appropriate government agency.  Where to find support  · Your health care provider can help you to find the right treatment. These discussions are confidential.  · The National [place] on Alcoholism and Drug Dependence (NCADD). This group has information about treatment centers and programs for people who have an addiction and for family members.  ? Call: 0-287-GKM-CALL (1-519.740.8092).  ? Visit the website: https://www.ncadd.org/  · The Substance Abuse and Mental Health Services Administration (SAMHSA). This organization will help you find publicly funded treatment centers, help hotlines, and counseling services near you.  ? Call: 8-604-190-HELP (1-894.998.2771).  ? Visit the website: www.findtreatment.samhsa.gov  · The National Problem Gambling Helpline. This is a 24-hour confidential helpline for gambling addiction.  ? Call: 9-479-824-2591  ? Visit the website: https://www.ncpgambling.org/  In countries outside of the [place] and [place], look in local directories for contact information for services in your area.  Follow these instructions at home:  · Find supportive people who will help you stay away from your addiction and stay sober.  · Do not use the substance or engage in the activity.  · If you have been through treatment:  ? Follow your plan. The plan is usually developed by you and your health care provider during treatment.  ? Go to meetings " with other people in recovery.  ? Avoid people, situations, and things that lead you to do the things you are addicted to (triggers).  Summary  · Addiction changes the way your brain works. These changes cause a desire to repeat and increase the use of the a substance or behavior.  · Addiction is a progressive disease. Without treatment, addiction can get worse. Living with addiction puts you at higher risk for injury, poor health, loss of employment, loss of money, and even death.  · There may be options for treatment programs and plans based on your addiction, condition, needs, and preferences. No single treatment is right for everyone.  · Your health care provider can help you to find the right treatment. These discussions are confidential.  This information is not intended to replace advice given to you by your health care provider. Make sure you discuss any questions you have with your health care provider.  Document Released: 11/16/2006 Document Revised: 01/16/2019 Document Reviewed: 01/16/2019  Elsevier Patient Education © 2020 Elsevier Inc.

## 2020-07-19 NOTE — CARE PLAN
Problem: Communication  Goal: The ability to communicate needs accurately and effectively will improve  Outcome: PROGRESSING AS EXPECTED     Problem: Safety  Goal: Will remain free from injury  Outcome: PROGRESSING AS EXPECTED  Goal: Will remain free from falls  Outcome: PROGRESSING AS EXPECTED     Problem: Infection  Goal: Will remain free from infection  Outcome: PROGRESSING AS EXPECTED     Problem: Venous Thromboembolism (VTW)/Deep Vein Thrombosis (DVT) Prevention:  Goal: Patient will participate in Venous Thrombosis (VTE)/Deep Vein Thrombosis (DVT)Prevention Measures  Outcome: PROGRESSING AS EXPECTED     Problem: Bowel/Gastric:  Goal: Normal bowel function is maintained or improved  Outcome: PROGRESSING AS EXPECTED  Goal: Will not experience complications related to bowel motility  Outcome: PROGRESSING AS EXPECTED     Problem: Knowledge Deficit  Goal: Knowledge of disease process/condition, treatment plan, diagnostic tests, and medications will improve  Outcome: PROGRESSING AS EXPECTED  Goal: Knowledge of the prescribed therapeutic regimen will improve  Outcome: PROGRESSING AS EXPECTED     Problem: Discharge Barriers/Planning  Goal: Patient's continuum of care needs will be met  Outcome: PROGRESSING AS EXPECTED     Problem: Fluid Volume:  Goal: Will maintain balanced intake and output  Outcome: PROGRESSING AS EXPECTED     Problem: Pain Management  Goal: Pain level will decrease to patient's comfort goal  Outcome: PROGRESSING AS EXPECTED     Problem: Psychosocial Needs:  Goal: Level of anxiety will decrease  Outcome: PROGRESSING AS EXPECTED

## 2020-07-19 NOTE — PROGRESS NOTES
Hospital Medicine Daily Progress Note    Date of Service  7/19/2020    Chief Complaint  25 y.o. male admitted 7/14/2020 with cellulitis of left arm    Hospital Course    25 y.o. 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  Patient has been using his left arm for IV injection of heroin and methamphetamine on a regular basis      Interval Problem Update  Patient is sleeping comfortably on the bed.  No acute issue overnight.  Continue IV antibiotics and his cellulitis is improving.  Decrease methadone dose to 2.5 mg twice daily.  Consultants/Specialty  none    Code Status  full    Disposition  Remain on the floor    Review of Systems  Review of Systems   Constitutional: Negative for chills and weight loss.   HENT: Negative for congestion and nosebleeds.    Eyes: Negative for pain and discharge.   Respiratory: Negative for cough, sputum production, shortness of breath and stridor.    Cardiovascular: Negative for palpitations and orthopnea.   Gastrointestinal: Negative for abdominal pain, diarrhea and vomiting.   Genitourinary: Negative for frequency and urgency.   Musculoskeletal: Negative for back pain, myalgias and neck pain.   Skin: Negative for itching and rash.   Neurological: Negative for focal weakness and headaches.        Physical Exam  Temp:  [36.3 °C (97.3 °F)-37.7 °C (99.8 °F)] 36.3 °C (97.4 °F)  Pulse:  [55-70] 58  Resp:  [14-18] 18  BP: (100-114)/(49-57) 100/51  SpO2:  [95 %-98 %] 98 %    Physical Exam  Vitals signs reviewed.   Constitutional:       General: He is not in acute distress.     Appearance: Normal appearance.   HENT:      Head: Normocephalic and atraumatic.      Nose: No congestion.   Eyes:      Extraocular Movements: Extraocular movements intact.      Pupils: Pupils are equal, round, and reactive to light.   Neck:       Musculoskeletal: Normal range of motion.   Cardiovascular:      Rate and Rhythm: Normal rate and regular rhythm.      Pulses: Normal pulses.   Pulmonary:      Effort: Pulmonary effort is normal. No respiratory distress.   Abdominal:      General: Bowel sounds are normal.      Palpations: Abdomen is soft.   Musculoskeletal:         General: No swelling.   Skin:     General: Skin is warm.      Comments: Left arm skin redness with marked border   Neurological:      General: No focal deficit present.      Mental Status: He is alert.         Fluids    Intake/Output Summary (Last 24 hours) at 7/19/2020 0822  Last data filed at 7/19/2020 0139  Gross per 24 hour   Intake 2315 ml   Output 3075 ml   Net -760 ml       Laboratory  Recent Labs     07/17/20  0932 07/18/20  0257 07/19/20  0136   WBC 10.8 12.8* 10.3   RBC 4.29* 4.00* 3.96*   HEMOGLOBIN 13.7* 12.4* 12.4*   HEMATOCRIT 39.5* 35.9* 37.5*   MCV 92.1 89.8 94.7   MCH 31.9 31.0 31.3   MCHC 34.7 34.5 33.1*   RDW 46.5 45.2 49.2   PLATELETCT 264 306 327   MPV 11.0 10.2 10.0     Recent Labs     07/17/20  0932 07/18/20  0257 07/19/20  0136   SODIUM 140 139 139   POTASSIUM 3.4* 3.4* 3.6   CHLORIDE 105 106 107   CO2 21 19* 19*   GLUCOSE 90 98 120*   BUN 4* 6* 5*   CREATININE 0.58 0.60 0.53   CALCIUM 8.4* 8.3* 8.4*                   Imaging  US-RUQ   Final Result         1.  Marked gallbladder wall thickening with trace pericholecystic fluid versus edematous gallbladder wall, appearance concerning for acalculous cholecystitis. Could be further evaluated with HIDA scan.   2.  Borderline hepatomegaly   3.  Right pleural effusion      CT-EXTREMITY, UPPER WITH LEFT   Final Result         1.  Subcutaneous fat stranding in the medial, ulnar, and volar arm near the elbow. Appearance favors cellulitis. No focal fluid collection to suggest abscess is seen.      US-EXTREMITY VENOUS UPPER UNILAT LEFT   Final Result      DX-CHEST-PORTABLE (1 VIEW)   Final Result         1.  No acute  cardiopulmonary disease.           Assessment/Plan  Elevated liver enzymes  Assessment & Plan  Monitor closely  Trending down a little bit  Ultrasound abdomen showed Marked gallbladder wall thickening with trace pericholecystic fluid versus edematous gallbladder wall, appearance concerning for acalculous cholecystitis. Could be further evaluated with HIDA scan.  We will get HIDA scan        Sepsis (HCC)  Assessment & Plan  This is Sepsis Present on admission  SIRS criteria identified on my evaluation include: Fever, with temperature greater than 101 deg F, Tachycardia, with heart rate greater than 90 BPM and Leukocytosis, with WBC greater than 12,000  Source is left arm cellulitis  Sepsis protocol initiated  Fluid resuscitation ordered per protocol  IV antibiotics as appropriate for source of sepsis  While organ dysfunction may be noted elsewhere in this problem list or in the chart, degree of organ dysfunction does not meet CMS criteria for severe sepsis          Left arm cellulitis  Assessment & Plan  Associated with sepsis  Ordered CT : cellulitis  Duplex usg: benign, no dvt  thrombophlebitis  Empiric antibiotics with IV vancomycin  Improving  Blood culture negative to date      Heroin abuse (HCC)- (present on admission)  Assessment & Plan  P.o. and IV opiates and lorazepam as needed for withdrawal symptoms  Cessation counseling       VTE prophylaxis: lovenox

## 2020-07-19 NOTE — PROGRESS NOTES
Bedside report received. POC discussed with pt; all questions answered at this time. Patient is resting in bed and expressed concerns about his IV leaking. IV assessed and removed as it was infiltrated. Dayshift RN placing new IV. Pt currently denies any further needs.

## 2020-07-19 NOTE — DISCHARGE SUMMARY
Discharge Summary    CHIEF COMPLAINT ON ADMISSION  Chief Complaint   Patient presents with   • Fever   • Wound Check     L arm        Reason for Admission  Rash; Fever; Nausea      Admission Date  7/14/2020    CODE STATUS  Full Code    HPI & HOSPITAL COURSE     25 y.o. 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  Patient has been using his left arm for IV injection of heroin and methamphetamine on a regular basis.  He was found to have sepsis related to left upper arm cellulitis.  He was empirically treated with IV antibiotic.  His culture were negative so far.  Ultrasound of the upper extremity done and showed no DVT or superficial thrombosis.  His symptoms continue to improve and today he stated that he would like to be discharged home.  He will follow-up with PCP within 1 week after discharge.  Please see below for discharge medication with antibiotic for additional 5 more days.  I saw and examined the patient upon discharge.  He was also educated regarding cessation of heroin and amphetamine use.  Please call [phone] to schedule PCP appointment for patient.            Therefore, he is discharged in fair and stable condition to home with close outpatient follow-up.    The patient met 2-midnight criteria for an inpatient stay at the time of discharge.    Discharge Date  07/19/20      FOLLOW UP ITEMS POST DISCHARGE      DISCHARGE DIAGNOSES  Active Problems:    Heroin abuse (HCC) POA: Yes    Left arm cellulitis POA: Unknown    Sepsis (HCC) POA: Unknown    Elevated liver enzymes POA: Unknown  Resolved Problems:    * No resolved hospital problems. *      FOLLOW UP  No future appointments.  PCP     In 1 week        MEDICATIONS ON DISCHARGE     Medication List      START taking these medications      Instructions   amoxicillin-clavulanate 875-125 MG  Tabs  Commonly known as:  AUGMENTIN   Take 1 Tab by mouth 2 times a day for 5 days.  Dose:  1 Tab     doxycycline 100 MG capsule  Commonly known as:  MONODOX   Take 1 Cap by mouth 2 times a day for 5 days.  Dose:  100 mg        CONTINUE taking these medications      Instructions   acetaminophen 325 MG Tabs  Commonly known as:  TYLENOL   Take 2 Tabs by mouth every 6 hours as needed (Mild Pain; (Pain scale 1-3); Temp greater than 100.5 F).  Dose:  650 mg     multivitamin Tabs   Take 1 Tab by mouth every day.  Dose:  1 Tab            Allergies  No Known Allergies    DIET  Orders Placed This Encounter   Procedures   • Diet Order Regular (Two powerades with all meals.)     Standing Status:   Standing     Number of Occurrences:   1     Order Specific Question:   Diet:     Answer:   Regular [1]     Comments:   Two powerades with all meals.       ACTIVITY  As tolerated.  Weight bearing as tolerated    CONSULTATIONS      PROCEDURES      LABORATORY  Lab Results   Component Value Date    SODIUM 139 07/19/2020    POTASSIUM 3.6 07/19/2020    CHLORIDE 107 07/19/2020    CO2 19 (L) 07/19/2020    GLUCOSE 120 (H) 07/19/2020    BUN 5 (L) 07/19/2020    CREATININE 0.53 07/19/2020        Lab Results   Component Value Date    WBC 10.3 07/19/2020    HEMOGLOBIN 12.4 (L) 07/19/2020    HEMATOCRIT 37.5 (L) 07/19/2020    PLATELETCT 327 07/19/2020        Total time of the discharge process exceeds 38 minutes.

## 2020-07-20 LAB
BACTERIA BLD CULT: NORMAL
BACTERIA BLD CULT: NORMAL
SIGNIFICANT IND 70042: NORMAL
SIGNIFICANT IND 70042: NORMAL
SITE SITE: NORMAL
SITE SITE: NORMAL
SOURCE SOURCE: NORMAL
SOURCE SOURCE: NORMAL

## 2020-08-06 ENCOUNTER — HOSPITAL ENCOUNTER (EMERGENCY)
Dept: HOSPITAL 8 - ED | Age: 25
Discharge: HOME | End: 2020-08-06
Payer: MEDICAID

## 2020-08-06 VITALS — BODY MASS INDEX: 18.52 KG/M2 | HEIGHT: 71 IN | WEIGHT: 132.28 LBS

## 2020-08-06 VITALS — SYSTOLIC BLOOD PRESSURE: 110 MMHG | DIASTOLIC BLOOD PRESSURE: 57 MMHG

## 2020-08-06 DIAGNOSIS — L03.115: Primary | ICD-10-CM

## 2020-08-06 DIAGNOSIS — M25.571: ICD-10-CM

## 2020-08-06 LAB
ALBUMIN SERPL-MCNC: 3.7 G/DL (ref 3.4–5)
ALP SERPL-CCNC: 91 U/L (ref 45–117)
ALT SERPL-CCNC: 23 U/L (ref 12–78)
ANION GAP SERPL CALC-SCNC: 5 MMOL/L (ref 5–15)
BASOPHILS # BLD AUTO: 0.02 X10^3/UL (ref 0–0.1)
BASOPHILS NFR BLD AUTO: 0 % (ref 0–1)
BILIRUB SERPL-MCNC: 0.7 MG/DL (ref 0.2–1)
CALCIUM SERPL-MCNC: 8.7 MG/DL (ref 8.5–10.1)
CHLORIDE SERPL-SCNC: 106 MMOL/L (ref 98–107)
CREAT SERPL-MCNC: 0.86 MG/DL (ref 0.7–1.3)
EOSINOPHIL # BLD AUTO: 0.23 X10^3/UL (ref 0–0.4)
EOSINOPHIL NFR BLD AUTO: 3 % (ref 1–7)
ERYTHROCYTE [DISTWIDTH] IN BLOOD BY AUTOMATED COUNT: 14.1 % (ref 9.4–14.8)
LYMPHOCYTES # BLD AUTO: 1.02 X10^3/UL (ref 1–3.4)
LYMPHOCYTES NFR BLD AUTO: 12 % (ref 22–44)
MCH RBC QN AUTO: 31.4 PG (ref 27.5–34.5)
MCHC RBC AUTO-ENTMCNC: 33.4 G/DL (ref 33.2–36.2)
MCV RBC AUTO: 93.9 FL (ref 81–97)
MD: NO
MONOCYTES # BLD AUTO: 1.02 X10^3/UL (ref 0.2–0.8)
MONOCYTES NFR BLD AUTO: 12 % (ref 2–9)
NEUTROPHILS # BLD AUTO: 6.31 X10^3/UL (ref 1.8–6.8)
NEUTROPHILS NFR BLD AUTO: 74 % (ref 42–75)
PLATELET # BLD AUTO: 347 X10^3/UL (ref 130–400)
PMV BLD AUTO: 7.4 FL (ref 7.4–10.4)
PROT SERPL-MCNC: 7.6 G/DL (ref 6.4–8.2)
RBC # BLD AUTO: 3.74 X10^6/UL (ref 4.38–5.82)

## 2020-08-06 PROCEDURE — 36415 COLL VENOUS BLD VENIPUNCTURE: CPT

## 2020-08-06 PROCEDURE — 85025 COMPLETE CBC W/AUTO DIFF WBC: CPT

## 2020-08-06 PROCEDURE — 99284 EMERGENCY DEPT VISIT MOD MDM: CPT

## 2020-08-06 PROCEDURE — 80053 COMPREHEN METABOLIC PANEL: CPT

## 2020-08-06 PROCEDURE — 83605 ASSAY OF LACTIC ACID: CPT

## 2020-08-06 PROCEDURE — 87040 BLOOD CULTURE FOR BACTERIA: CPT

## 2020-08-06 PROCEDURE — 96365 THER/PROPH/DIAG IV INF INIT: CPT

## 2023-07-25 NOTE — DISCHARGE PLANNING
Nutrition Assessment   Reason for Consult/Assessment: Initial, BMI, Nursing nutrition screen (MST), Wound consult, Malnutrition consult     Diagnosis and Hx: Reviewed    Pertinent Nutrition History: Stage IV bladder cancer with pulmonary and bone metastasis, CKD, GERD, lupus, chronic macrocytic anemia, last chemotherapy 7/19/23.  Has been very weak, has a rash on buttocks, having up to 6 loose stools/day.    Pertinent Nutrition Information: Ate 10% of breakfast this morning.  Attempted to meet with patient, patient on phone and per family in room patient moving rooms soon.                                 Diet Order: Regular                  Diet tolerance: Tolerating with poor appetite/intakes recorded   Food Allergies: None known    Demographic/Anthropometrics Information  Gender: male  Patient Age: 67 year old  Height:   Ht Readings from Last 1 Encounters:   07/25/23 5' 6\" (1.676 m)      Weight:   Wt Readings from Last 1 Encounters:   07/25/23 48.6 kg      BMI:   BMI Readings from Last 1 Encounters:   07/25/23 17.29 kg/m²       Usual Weight: 75 kg    % Weight Change: -15.5 kg/24% in the last year per EHR (includes weight loss of 16.8% in the last 6 months and 10.8% in the last 4 months).    Weight change significant: Yes  Reason for weight change: Decreased intake     Estimated Needs:  Calculated Energy Needs: 3699-1705  kcal    Calculated protein needs: 58-97  g    Calculated Fluid Needs: 1ml/kcal              NFPE  Nutrition Focused Physical Exam  Physical Exam Completed: No  Reason Not Completed: Patient unavailable (On the phone)        Skin Assessment/Wounds  Wounds Present: Wounds present (Rash)             TREATMENT PLAN: Monitoring & Interventions   Intervention: Meals and snacks, Nutrition supplement therapy, Coordination of nutrition care by a nutrition professional     Coordination of nutrition care: Will re-assess by 7/26/23 to obtain diet recall/nutrition history from patient and perform  Wound care orders and DC summary faxed to ANGIE at [facility].    NFPE.    Meals & snacks: Diet liberalized from Cardiac to Regular.    Nutrition supplement therapy: Will order Ensure Plus High Protein 8 oz BID (each serving provides 350 kcal, 20 grams protein).       Goal: Increase oral intake to >/equal 50% of meals and supplements   Intervention goal status: Initiated  Time frame to achieve goal: 3-5 days     Dietitian will monitor: Food, beverage, and nutrient intake, Nutrition-focused physical findings            Nutrition Diagnosis / PES  Nutrition Diagnosis: Inadequate oral intake  Related to: Nausea, decreased appetite, weakness, chemotherapy, cancer  As evidenced by: Weight loss over time, Documented/reported poor oral intake      Primary Nutrition Diagnosis status: New nutrition diagnosis

## (undated) DEVICE — NEEDLE NON SAFETY HYPO 22 GA X 1 1/2 IN (100/BX)

## (undated) DEVICE — TUBE, CULTURE AEROBIC

## (undated) DEVICE — BLADE SURGICAL #15 - (50/BX 3BX/CA)

## (undated) DEVICE — GLOVE, LITE (PAIR)

## (undated) DEVICE — PACK MINOR BASIN - (2EA/CA)

## (undated) DEVICE — NEPTUNE 4 PORT MANIFOLD - (20/PK)

## (undated) DEVICE — ELECTRODE DUAL RETURN W/ CORD - (50/PK)

## (undated) DEVICE — CHLORAPREP 26 ML APPLICATOR - ORANGE TINT(25/CA)

## (undated) DEVICE — KIT ROOM DECONTAMINATION

## (undated) DEVICE — DRAPE LAPAROTOMY T SHEET - (12EA/CA)

## (undated) DEVICE — GLOVE BIOGEL SZ 8 SURGICAL PF LTX - (50PR/BX 4BX/CA)

## (undated) DEVICE — HEAD HOLDER JUNIOR/ADULT

## (undated) DEVICE — GAUZE PACKING STRIP PLAIN STERILE - 1 IN X 5 YD (12/CA)

## (undated) DEVICE — SODIUM CHL IRRIGATION 0.9% 1000ML (12EA/CA)

## (undated) DEVICE — SODIUM CHL. IRRIGATION 0.9% 3000ML (4EA/CA 65CA/PF)

## (undated) DEVICE — SWAB ANAEROBIC SPEC.COLLECTOR - (25/PK 4PK/CA 100EA/CA)

## (undated) DEVICE — BLADE SURGICAL #11 - (50/BX)

## (undated) DEVICE — SYRINGE 10 ML CONTROL LL (25EA/BX 4BX/CA)